# Patient Record
Sex: FEMALE | Race: WHITE | URBAN - METROPOLITAN AREA
[De-identification: names, ages, dates, MRNs, and addresses within clinical notes are randomized per-mention and may not be internally consistent; named-entity substitution may affect disease eponyms.]

---

## 2019-04-15 ENCOUNTER — OUTPATIENT (OUTPATIENT)
Dept: OUTPATIENT SERVICES | Facility: HOSPITAL | Age: 69
LOS: 1 days | Discharge: HOME | End: 2019-04-15
Payer: MEDICARE

## 2019-04-15 DIAGNOSIS — R06.02 SHORTNESS OF BREATH: ICD-10-CM

## 2019-04-15 PROCEDURE — 78452 HT MUSCLE IMAGE SPECT MULT: CPT | Mod: 26

## 2022-11-02 ENCOUNTER — OUTPATIENT (OUTPATIENT)
Dept: OUTPATIENT SERVICES | Facility: HOSPITAL | Age: 72
LOS: 1 days | Discharge: HOME | End: 2022-11-02

## 2022-11-02 VITALS
DIASTOLIC BLOOD PRESSURE: 77 MMHG | RESPIRATION RATE: 17 BRPM | WEIGHT: 263.89 LBS | HEART RATE: 60 BPM | HEIGHT: 63 IN | TEMPERATURE: 96 F | OXYGEN SATURATION: 96 % | SYSTOLIC BLOOD PRESSURE: 175 MMHG

## 2022-11-02 VITALS
HEART RATE: 55 BPM | RESPIRATION RATE: 16 BRPM | DIASTOLIC BLOOD PRESSURE: 58 MMHG | SYSTOLIC BLOOD PRESSURE: 126 MMHG | OXYGEN SATURATION: 96 %

## 2022-11-02 DIAGNOSIS — Z90.49 ACQUIRED ABSENCE OF OTHER SPECIFIED PARTS OF DIGESTIVE TRACT: Chronic | ICD-10-CM

## 2022-11-02 DIAGNOSIS — Z98.891 HISTORY OF UTERINE SCAR FROM PREVIOUS SURGERY: Chronic | ICD-10-CM

## 2022-11-02 NOTE — ASU PATIENT PROFILE, ADULT - NSICDXPASTMEDICALHX_GEN_ALL_CORE_FT
PAST MEDICAL HISTORY:  Anxiety     Arthritis pain b/l knee    H/O neuropathy     High cholesterol     HTN (hypertension)     Sleep apnea

## 2022-11-06 DIAGNOSIS — I50.32 CHRONIC DIASTOLIC (CONGESTIVE) HEART FAILURE: ICD-10-CM

## 2022-11-06 DIAGNOSIS — G47.33 OBSTRUCTIVE SLEEP APNEA (ADULT) (PEDIATRIC): ICD-10-CM

## 2022-11-06 DIAGNOSIS — H26.9 UNSPECIFIED CATARACT: ICD-10-CM

## 2022-11-06 DIAGNOSIS — F20.9 SCHIZOPHRENIA, UNSPECIFIED: ICD-10-CM

## 2022-11-06 DIAGNOSIS — E66.01 MORBID (SEVERE) OBESITY DUE TO EXCESS CALORIES: ICD-10-CM

## 2022-11-06 DIAGNOSIS — H57.04 MYDRIASIS: ICD-10-CM

## 2022-11-06 DIAGNOSIS — Z90.49 ACQUIRED ABSENCE OF OTHER SPECIFIED PARTS OF DIGESTIVE TRACT: ICD-10-CM

## 2022-11-06 DIAGNOSIS — I13.0 HYPERTENSIVE HEART AND CHRONIC KIDNEY DISEASE WITH HEART FAILURE AND STAGE 1 THROUGH STAGE 4 CHRONIC KIDNEY DISEASE, OR UNSPECIFIED CHRONIC KIDNEY DISEASE: ICD-10-CM

## 2022-11-06 DIAGNOSIS — F41.9 ANXIETY DISORDER, UNSPECIFIED: ICD-10-CM

## 2022-11-06 DIAGNOSIS — E78.00 PURE HYPERCHOLESTEROLEMIA, UNSPECIFIED: ICD-10-CM

## 2022-11-06 DIAGNOSIS — F33.2 MAJOR DEPRESSIVE DISORDER, RECURRENT SEVERE WITHOUT PSYCHOTIC FEATURES: ICD-10-CM

## 2022-11-06 DIAGNOSIS — M19.90 UNSPECIFIED OSTEOARTHRITIS, UNSPECIFIED SITE: ICD-10-CM

## 2022-11-06 DIAGNOSIS — Z79.82 LONG TERM (CURRENT) USE OF ASPIRIN: ICD-10-CM

## 2022-11-06 DIAGNOSIS — N18.31 CHRONIC KIDNEY DISEASE, STAGE 3A: ICD-10-CM

## 2022-11-16 ENCOUNTER — OUTPATIENT (OUTPATIENT)
Dept: OUTPATIENT SERVICES | Facility: HOSPITAL | Age: 72
LOS: 1 days | Discharge: HOME | End: 2022-11-16

## 2022-11-16 VITALS
HEART RATE: 59 BPM | RESPIRATION RATE: 59 BRPM | WEIGHT: 268.08 LBS | DIASTOLIC BLOOD PRESSURE: 66 MMHG | SYSTOLIC BLOOD PRESSURE: 154 MMHG | TEMPERATURE: 96 F | HEIGHT: 63 IN | OXYGEN SATURATION: 96 %

## 2022-11-16 VITALS
HEART RATE: 54 BPM | SYSTOLIC BLOOD PRESSURE: 139 MMHG | OXYGEN SATURATION: 95 % | RESPIRATION RATE: 16 BRPM | DIASTOLIC BLOOD PRESSURE: 87 MMHG

## 2022-11-16 DIAGNOSIS — Z98.891 HISTORY OF UTERINE SCAR FROM PREVIOUS SURGERY: Chronic | ICD-10-CM

## 2022-11-16 DIAGNOSIS — Z90.49 ACQUIRED ABSENCE OF OTHER SPECIFIED PARTS OF DIGESTIVE TRACT: Chronic | ICD-10-CM

## 2022-11-16 DIAGNOSIS — Z98.49 CATARACT EXTRACTION STATUS, UNSPECIFIED EYE: Chronic | ICD-10-CM

## 2022-11-16 RX ORDER — AZELASTINE HCL 0.05 %
1 DROPS OPHTHALMIC (EYE)
Qty: 0 | Refills: 0 | DISCHARGE

## 2022-11-16 NOTE — ASU PATIENT PROFILE, ADULT - NSICDXPASTSURGICALHX_GEN_ALL_CORE_FT
PAST SURGICAL HISTORY:  H/O cataract extraction right with iol    H/O  section     History of cholecystectomy     History of colon resection

## 2022-11-16 NOTE — ASU PATIENT PROFILE, ADULT - NSICDXPASTMEDICALHX_GEN_ALL_CORE_FT
PAST MEDICAL HISTORY:  Anxiety     Arthritis pain b/l knee    Cancer, colon     Cataract     Chronic CHF     H/O neuropathy     High cholesterol     HTN (hypertension)     Sleep apnea      PAST MEDICAL HISTORY:  Anxiety and depression     Arthritis pain b/l knee    Cancer, colon     Cataract     Chronic CHF     H/O neuropathy     High cholesterol     HTN (hypertension)     Sleep apnea

## 2022-11-16 NOTE — PRE-ANESTHESIA EVALUATION ADULT - NSANTHOSAYNRD_GEN_A_CORE
denies/No. TACOS screening performed.  STOP BANG Legend: 0-2 = LOW Risk; 3-4 = INTERMEDIATE Risk; 5-8 = HIGH Risk

## 2022-11-16 NOTE — ASU PATIENT PROFILE, ADULT - FALL HARM RISK - HARM RISK INTERVENTIONS
Communicate Risk of Fall with Harm to all staff/Reinforce activity limits and safety measures with patient and family/Tailored Fall Risk Interventions/Visual Cue: Yellow wristband and red socks/Bed in lowest position, wheels locked, appropriate side rails in place/Call bell, personal items and telephone in reach/Instruct patient to call for assistance before getting out of bed or chair/Non-slip footwear when patient is out of bed/[place] to call system/Physically safe environment - no spills, clutter or unnecessary equipment/Purposeful Proactive Rounding/Room/bathroom lighting operational, light cord in reach Assistance OOB with selected safe patient handling equipment/Communicate Risk of Fall with Harm to all staff/Discuss with provider need for PT consult/Monitor gait and stability/Provide patient with walking aids - walker, cane, crutches/Reinforce activity limits and safety measures with patient and family/Tailored Fall Risk Interventions/Visual Cue: Yellow wristband and red socks/Bed in lowest position, wheels locked, appropriate side rails in place/Call bell, personal items and telephone in reach/Instruct patient to call for assistance before getting out of bed or chair/Non-slip footwear when patient is out of bed/[place] to call system/Physically safe environment - no spills, clutter or unnecessary equipment/Purposeful Proactive Rounding/Room/bathroom lighting operational, light cord in reach

## 2022-11-21 DIAGNOSIS — Z79.1 LONG TERM (CURRENT) USE OF NON-STEROIDAL ANTI-INFLAMMATORIES (NSAID): ICD-10-CM

## 2022-11-21 DIAGNOSIS — M19.90 UNSPECIFIED OSTEOARTHRITIS, UNSPECIFIED SITE: ICD-10-CM

## 2022-11-21 DIAGNOSIS — I50.9 HEART FAILURE, UNSPECIFIED: ICD-10-CM

## 2022-11-21 DIAGNOSIS — H25.89 OTHER AGE-RELATED CATARACT: ICD-10-CM

## 2022-11-21 DIAGNOSIS — F41.9 ANXIETY DISORDER, UNSPECIFIED: ICD-10-CM

## 2022-11-21 DIAGNOSIS — E78.00 PURE HYPERCHOLESTEROLEMIA, UNSPECIFIED: ICD-10-CM

## 2022-11-21 DIAGNOSIS — F32.9 MAJOR DEPRESSIVE DISORDER, SINGLE EPISODE, UNSPECIFIED: ICD-10-CM

## 2022-11-21 DIAGNOSIS — Z85.038 PERSONAL HISTORY OF OTHER MALIGNANT NEOPLASM OF LARGE INTESTINE: ICD-10-CM

## 2022-11-21 DIAGNOSIS — G47.33 OBSTRUCTIVE SLEEP APNEA (ADULT) (PEDIATRIC): ICD-10-CM

## 2022-11-21 DIAGNOSIS — I11.0 HYPERTENSIVE HEART DISEASE WITH HEART FAILURE: ICD-10-CM

## 2023-06-07 ENCOUNTER — INPATIENT (INPATIENT)
Facility: HOSPITAL | Age: 73
LOS: 1 days | Discharge: HOME | DRG: 309 | End: 2023-06-09
Attending: INTERNAL MEDICINE | Admitting: HOSPITALIST
Payer: MEDICARE

## 2023-06-07 VITALS
HEART RATE: 49 BPM | SYSTOLIC BLOOD PRESSURE: 193 MMHG | OXYGEN SATURATION: 95 % | WEIGHT: 261.91 LBS | TEMPERATURE: 97 F | HEIGHT: 63 IN | RESPIRATION RATE: 18 BRPM | DIASTOLIC BLOOD PRESSURE: 71 MMHG

## 2023-06-07 DIAGNOSIS — Z98.49 CATARACT EXTRACTION STATUS, UNSPECIFIED EYE: Chronic | ICD-10-CM

## 2023-06-07 DIAGNOSIS — Z00.6 ENCOUNTER FOR EXAMINATION FOR NORMAL COMPARISON AND CONTROL IN CLINICAL RESEARCH PROGRAM: ICD-10-CM

## 2023-06-07 DIAGNOSIS — Z90.49 ACQUIRED ABSENCE OF OTHER SPECIFIED PARTS OF DIGESTIVE TRACT: Chronic | ICD-10-CM

## 2023-06-07 DIAGNOSIS — Z98.891 HISTORY OF UTERINE SCAR FROM PREVIOUS SURGERY: Chronic | ICD-10-CM

## 2023-06-07 DIAGNOSIS — R00.1 BRADYCARDIA, UNSPECIFIED: ICD-10-CM

## 2023-06-07 PROBLEM — H26.9 UNSPECIFIED CATARACT: Chronic | Status: ACTIVE | Noted: 2022-11-16

## 2023-06-07 PROBLEM — M19.90 UNSPECIFIED OSTEOARTHRITIS, UNSPECIFIED SITE: Chronic | Status: ACTIVE | Noted: 2022-11-02

## 2023-06-07 PROBLEM — E78.00 PURE HYPERCHOLESTEROLEMIA, UNSPECIFIED: Chronic | Status: ACTIVE | Noted: 2022-11-02

## 2023-06-07 PROBLEM — I50.9 HEART FAILURE, UNSPECIFIED: Chronic | Status: ACTIVE | Noted: 2022-11-16

## 2023-06-07 PROBLEM — I10 ESSENTIAL (PRIMARY) HYPERTENSION: Chronic | Status: ACTIVE | Noted: 2022-11-02

## 2023-06-07 PROBLEM — F41.9 ANXIETY DISORDER, UNSPECIFIED: Chronic | Status: ACTIVE | Noted: 2022-11-16

## 2023-06-07 PROBLEM — G47.30 SLEEP APNEA, UNSPECIFIED: Chronic | Status: ACTIVE | Noted: 2022-11-02

## 2023-06-07 PROBLEM — C18.9 MALIGNANT NEOPLASM OF COLON, UNSPECIFIED: Chronic | Status: ACTIVE | Noted: 2022-11-16

## 2023-06-07 PROBLEM — Z86.69 PERSONAL HISTORY OF OTHER DISEASES OF THE NERVOUS SYSTEM AND SENSE ORGANS: Chronic | Status: ACTIVE | Noted: 2022-11-02

## 2023-06-07 LAB
ALBUMIN SERPL ELPH-MCNC: 3.9 G/DL — SIGNIFICANT CHANGE UP (ref 3.5–5.2)
ALP SERPL-CCNC: 100 U/L — SIGNIFICANT CHANGE UP (ref 30–115)
ALT FLD-CCNC: 10 U/L — SIGNIFICANT CHANGE UP (ref 0–41)
ANION GAP SERPL CALC-SCNC: 10 MMOL/L — SIGNIFICANT CHANGE UP (ref 7–14)
APPEARANCE UR: CLEAR — SIGNIFICANT CHANGE UP
APTT BLD: 34.7 SEC — SIGNIFICANT CHANGE UP (ref 27–39.2)
AST SERPL-CCNC: 21 U/L — SIGNIFICANT CHANGE UP (ref 0–41)
BACTERIA # UR AUTO: NEGATIVE — SIGNIFICANT CHANGE UP
BASE EXCESS BLDV CALC-SCNC: 5.1 MMOL/L — HIGH (ref -2–3)
BASOPHILS # BLD AUTO: 0.03 K/UL — SIGNIFICANT CHANGE UP (ref 0–0.2)
BASOPHILS NFR BLD AUTO: 0.3 % — SIGNIFICANT CHANGE UP (ref 0–1)
BILIRUB SERPL-MCNC: 0.7 MG/DL — SIGNIFICANT CHANGE UP (ref 0.2–1.2)
BILIRUB UR-MCNC: NEGATIVE — SIGNIFICANT CHANGE UP
BLD GP AB SCN SERPL QL: SIGNIFICANT CHANGE UP
BUN SERPL-MCNC: 14 MG/DL — SIGNIFICANT CHANGE UP (ref 10–20)
CA-I SERPL-SCNC: 1.26 MMOL/L — SIGNIFICANT CHANGE UP (ref 1.15–1.33)
CALCIUM SERPL-MCNC: 10.3 MG/DL — SIGNIFICANT CHANGE UP (ref 8.4–10.4)
CHLORIDE SERPL-SCNC: 102 MMOL/L — SIGNIFICANT CHANGE UP (ref 98–110)
CO2 SERPL-SCNC: 29 MMOL/L — SIGNIFICANT CHANGE UP (ref 17–32)
COLOR SPEC: SIGNIFICANT CHANGE UP
CREAT SERPL-MCNC: 0.8 MG/DL — SIGNIFICANT CHANGE UP (ref 0.7–1.5)
DIFF PNL FLD: NEGATIVE — SIGNIFICANT CHANGE UP
EGFR: 77 ML/MIN/1.73M2 — SIGNIFICANT CHANGE UP
EOSINOPHIL # BLD AUTO: 0.16 K/UL — SIGNIFICANT CHANGE UP (ref 0–0.7)
EOSINOPHIL NFR BLD AUTO: 1.6 % — SIGNIFICANT CHANGE UP (ref 0–8)
EPI CELLS # UR: 2 /HPF — SIGNIFICANT CHANGE UP (ref 0–5)
GAS PNL BLDV: 140 MMOL/L — SIGNIFICANT CHANGE UP (ref 136–145)
GAS PNL BLDV: SIGNIFICANT CHANGE UP
GLUCOSE SERPL-MCNC: 86 MG/DL — SIGNIFICANT CHANGE UP (ref 70–99)
GLUCOSE UR QL: NEGATIVE — SIGNIFICANT CHANGE UP
HCO3 BLDV-SCNC: 30 MMOL/L — HIGH (ref 22–29)
HCT VFR BLD CALC: 46.4 % — SIGNIFICANT CHANGE UP (ref 37–47)
HCT VFR BLDA CALC: 40 % — SIGNIFICANT CHANGE UP (ref 39–51)
HGB BLD CALC-MCNC: 13.4 G/DL — SIGNIFICANT CHANGE UP (ref 12.6–17.4)
HGB BLD-MCNC: 15.2 G/DL — SIGNIFICANT CHANGE UP (ref 12–16)
HYALINE CASTS # UR AUTO: 0 /LPF — SIGNIFICANT CHANGE UP (ref 0–7)
IMM GRANULOCYTES NFR BLD AUTO: 0.4 % — HIGH (ref 0.1–0.3)
INR BLD: 1.08 RATIO — SIGNIFICANT CHANGE UP (ref 0.65–1.3)
KETONES UR-MCNC: NEGATIVE — SIGNIFICANT CHANGE UP
LACTATE BLDV-MCNC: 0.8 MMOL/L — SIGNIFICANT CHANGE UP (ref 0.5–2)
LACTATE SERPL-SCNC: 0.9 MMOL/L — SIGNIFICANT CHANGE UP (ref 0.7–2)
LEUKOCYTE ESTERASE UR-ACNC: ABNORMAL
LYMPHOCYTES # BLD AUTO: 2.65 K/UL — SIGNIFICANT CHANGE UP (ref 1.2–3.4)
LYMPHOCYTES # BLD AUTO: 25.8 % — SIGNIFICANT CHANGE UP (ref 20.5–51.1)
MAGNESIUM SERPL-MCNC: 2.1 MG/DL — SIGNIFICANT CHANGE UP (ref 1.8–2.4)
MCHC RBC-ENTMCNC: 30 PG — SIGNIFICANT CHANGE UP (ref 27–31)
MCHC RBC-ENTMCNC: 32.8 G/DL — SIGNIFICANT CHANGE UP (ref 32–37)
MCV RBC AUTO: 91.7 FL — SIGNIFICANT CHANGE UP (ref 81–99)
MONOCYTES # BLD AUTO: 0.96 K/UL — HIGH (ref 0.1–0.6)
MONOCYTES NFR BLD AUTO: 9.3 % — SIGNIFICANT CHANGE UP (ref 1.7–9.3)
NEUTROPHILS # BLD AUTO: 6.44 K/UL — SIGNIFICANT CHANGE UP (ref 1.4–6.5)
NEUTROPHILS NFR BLD AUTO: 62.6 % — SIGNIFICANT CHANGE UP (ref 42.2–75.2)
NITRITE UR-MCNC: NEGATIVE — SIGNIFICANT CHANGE UP
NRBC # BLD: 0 /100 WBCS — SIGNIFICANT CHANGE UP (ref 0–0)
PCO2 BLDV: 47 MMHG — HIGH (ref 39–42)
PH BLDV: 7.42 — SIGNIFICANT CHANGE UP (ref 7.32–7.43)
PH UR: 8 — SIGNIFICANT CHANGE UP (ref 5–8)
PLATELET # BLD AUTO: 294 K/UL — SIGNIFICANT CHANGE UP (ref 130–400)
PMV BLD: 10.1 FL — SIGNIFICANT CHANGE UP (ref 7.4–10.4)
PO2 BLDV: 42 MMHG — SIGNIFICANT CHANGE UP
POTASSIUM BLDV-SCNC: 3.7 MMOL/L — SIGNIFICANT CHANGE UP (ref 3.5–5.1)
POTASSIUM SERPL-MCNC: 3.8 MMOL/L — SIGNIFICANT CHANGE UP (ref 3.5–5)
POTASSIUM SERPL-SCNC: 3.8 MMOL/L — SIGNIFICANT CHANGE UP (ref 3.5–5)
PROT SERPL-MCNC: 7.7 G/DL — SIGNIFICANT CHANGE UP (ref 6–8)
PROT UR-MCNC: NEGATIVE — SIGNIFICANT CHANGE UP
PROTHROM AB SERPL-ACNC: 12.3 SEC — SIGNIFICANT CHANGE UP (ref 9.95–12.87)
RBC # BLD: 5.06 M/UL — SIGNIFICANT CHANGE UP (ref 4.2–5.4)
RBC # FLD: 14 % — SIGNIFICANT CHANGE UP (ref 11.5–14.5)
RBC CASTS # UR COMP ASSIST: 0 /HPF — SIGNIFICANT CHANGE UP (ref 0–4)
SAO2 % BLDV: 71.7 % — SIGNIFICANT CHANGE UP
SODIUM SERPL-SCNC: 141 MMOL/L — SIGNIFICANT CHANGE UP (ref 135–146)
SP GR SPEC: 1.02 — SIGNIFICANT CHANGE UP (ref 1.01–1.03)
TROPONIN T SERPL-MCNC: <0.01 NG/ML — SIGNIFICANT CHANGE UP
UROBILINOGEN FLD QL: SIGNIFICANT CHANGE UP
WBC # BLD: 10.28 K/UL — SIGNIFICANT CHANGE UP (ref 4.8–10.8)
WBC # FLD AUTO: 10.28 K/UL — SIGNIFICANT CHANGE UP (ref 4.8–10.8)
WBC UR QL: 5 /HPF — SIGNIFICANT CHANGE UP (ref 0–5)

## 2023-06-07 PROCEDURE — 83880 ASSAY OF NATRIURETIC PEPTIDE: CPT

## 2023-06-07 PROCEDURE — 70450 CT HEAD/BRAIN W/O DYE: CPT | Mod: 26,59,MA

## 2023-06-07 PROCEDURE — 70498 CT ANGIOGRAPHY NECK: CPT | Mod: 26,MA

## 2023-06-07 PROCEDURE — 36415 COLL VENOUS BLD VENIPUNCTURE: CPT

## 2023-06-07 PROCEDURE — 70496 CT ANGIOGRAPHY HEAD: CPT | Mod: 26,MA

## 2023-06-07 PROCEDURE — 93017 CV STRESS TEST TRACING ONLY: CPT

## 2023-06-07 PROCEDURE — 83735 ASSAY OF MAGNESIUM: CPT

## 2023-06-07 PROCEDURE — 84100 ASSAY OF PHOSPHORUS: CPT

## 2023-06-07 PROCEDURE — 80053 COMPREHEN METABOLIC PANEL: CPT

## 2023-06-07 PROCEDURE — 93307 TTE W/O DOPPLER COMPLETE: CPT

## 2023-06-07 PROCEDURE — 93005 ELECTROCARDIOGRAM TRACING: CPT | Mod: Z9

## 2023-06-07 PROCEDURE — 71045 X-RAY EXAM CHEST 1 VIEW: CPT

## 2023-06-07 PROCEDURE — 84443 ASSAY THYROID STIM HORMONE: CPT

## 2023-06-07 PROCEDURE — 83036 HEMOGLOBIN GLYCOSYLATED A1C: CPT

## 2023-06-07 PROCEDURE — 99223 1ST HOSP IP/OBS HIGH 75: CPT

## 2023-06-07 PROCEDURE — 93970 EXTREMITY STUDY: CPT | Mod: 26

## 2023-06-07 PROCEDURE — 86803 HEPATITIS C AB TEST: CPT

## 2023-06-07 PROCEDURE — 80061 LIPID PANEL: CPT

## 2023-06-07 PROCEDURE — 85025 COMPLETE CBC W/AUTO DIFF WBC: CPT

## 2023-06-07 PROCEDURE — 99285 EMERGENCY DEPT VISIT HI MDM: CPT

## 2023-06-07 PROCEDURE — 84484 ASSAY OF TROPONIN QUANT: CPT

## 2023-06-07 RX ORDER — LIDOCAINE 4 G/100G
0 CREAM TOPICAL
Qty: 0 | Refills: 0 | DISCHARGE

## 2023-06-07 RX ORDER — ATORVASTATIN CALCIUM 80 MG/1
0 TABLET, FILM COATED ORAL
Qty: 0 | Refills: 0 | DISCHARGE

## 2023-06-07 RX ORDER — ACETAMINOPHEN 500 MG
2 TABLET ORAL
Qty: 0 | Refills: 0 | DISCHARGE

## 2023-06-07 RX ORDER — FUROSEMIDE 40 MG
40 TABLET ORAL DAILY
Refills: 0 | Status: DISCONTINUED | OUTPATIENT
Start: 2023-06-07 | End: 2023-06-09

## 2023-06-07 RX ORDER — TRAZODONE HCL 50 MG
0 TABLET ORAL
Qty: 0 | Refills: 0 | DISCHARGE

## 2023-06-07 RX ORDER — MECLIZINE HCL 12.5 MG
50 TABLET ORAL ONCE
Refills: 0 | Status: COMPLETED | OUTPATIENT
Start: 2023-06-07 | End: 2023-06-07

## 2023-06-07 RX ORDER — LANOLIN ALCOHOL/MO/W.PET/CERES
5 CREAM (GRAM) TOPICAL AT BEDTIME
Refills: 0 | Status: DISCONTINUED | OUTPATIENT
Start: 2023-06-07 | End: 2023-06-09

## 2023-06-07 RX ORDER — HYDROXYZINE HCL 10 MG
1 TABLET ORAL
Qty: 0 | Refills: 0 | DISCHARGE

## 2023-06-07 RX ORDER — DILTIAZEM HCL 120 MG
1 CAPSULE, EXT RELEASE 24 HR ORAL
Qty: 0 | Refills: 0 | DISCHARGE

## 2023-06-07 RX ORDER — ENOXAPARIN SODIUM 100 MG/ML
40 INJECTION SUBCUTANEOUS EVERY 24 HOURS
Refills: 0 | Status: DISCONTINUED | OUTPATIENT
Start: 2023-06-07 | End: 2023-06-09

## 2023-06-07 RX ORDER — FUROSEMIDE 40 MG
0 TABLET ORAL
Qty: 0 | Refills: 0 | DISCHARGE

## 2023-06-07 RX ORDER — LISINOPRIL 2.5 MG/1
20 TABLET ORAL DAILY
Refills: 0 | Status: DISCONTINUED | OUTPATIENT
Start: 2023-06-08 | End: 2023-06-09

## 2023-06-07 RX ORDER — CAPTOPRIL 12.5 MG/1
25 TABLET ORAL ONCE
Refills: 0 | Status: DISCONTINUED | OUTPATIENT
Start: 2023-06-07 | End: 2023-06-07

## 2023-06-07 RX ADMIN — Medication 50 MILLIGRAM(S): at 14:14

## 2023-06-07 NOTE — ED PROVIDER NOTE - NSICDXPASTMEDICALHX_GEN_ALL_CORE_FT
PAST MEDICAL HISTORY:  Anxiety and depression     Arthritis pain b/l knee    Cancer, colon     Cataract     Chronic CHF     H/O neuropathy     High cholesterol     HTN (hypertension)     Sleep apnea

## 2023-06-07 NOTE — ED PROVIDER NOTE - OBJECTIVE STATEMENT
74 yo f with pmh of htn, peripheral edema, colon CA in remission, htn, hld, anxiety/depression on trazadone, sent by pmd's office for worsening dizziness and bradycardia.  pt says has been having dizziness for a while now, but worse in the last month.  noted on her pulse ox that her hr was 39-40s 10 days ago, so stopped her diltiazem.  pt says she felt better for a few days, but symptoms started again.  pt says feels worse with movement.  feels spinning and lightheaded.  walking normally.  mild nausea.  no abd pain, no cp.  no sob.  noted LLE swelling, pt says "for a while now".  pt sees dr. page morrell, last time 9 months ago, but says she cant' remember if any tests were done

## 2023-06-07 NOTE — H&P ADULT - ASSESSMENT
a 73 year old female with past medical history of HTN, HLD, colon cancer S/P surgery, in remission. Hx of anxiety/depression disorder, CHF?. She was referred to the emergency department from her PCP clinic for further evaluation of bradycardia.       # Symptomatic bradycardia with dizziness  # Impression: Trazodone-induced, SA node dysfunction need to be ruled out   # Possible CHF?  # HTN, HLD  - Currently she is free of dizziness but it happens mainly with exercise sometimes  - Review of system is positive for occasional palpitations and lower limb edema   - Examination is unremarkable, Her HR increased on standing reaching 60's. Resting HR on telemetry is 51.   - EKG showed no signs of ischemia, sinus bradycardia with occasional PVC  - CTA of the brain showed no acute pathology   - negative troponin   - DC trazodone  - Follow up echocardiogram, TSH, BNP  - Repeat EKG, troponin in AM  - CXR   - Continue Lasix as 40 mg daily ( she wasn't compliant with twice a day )  - start Lisinopril 20 mg daily ( was on home diltiazem that was discontinued earlier )  - check orthostatics   - EP consult  - continue telemetry monitoring       # Hypertensive urgency in the ED  - repeated BP on evaluation showing /90 ( was 190/90 )  - Was on home diltiazem that was discontinued   - start lisinopril 20 mg daily   - add amlodipine if not improving       # Hx of colon cancer in remission  # Hx of anxiety/depression disorder       GI prophylaxis: not needed   DVT prophylaxis: Enoxaparin 40 mg   Full code  Diet: regular DASH  a 73 year old female with past medical history of HTN, HLD, colon cancer S/P surgery, in remission. Hx of anxiety/depression disorder, CHF?. She was referred to the emergency department from her PCP clinic for further evaluation of bradycardia.       # Symptomatic bradycardia with dizziness  # Impression: Trazodone-induced, SA node dysfunction need to be ruled out   # Possible CHF?  # HTN, HLD  - Currently she is free of dizziness but it happens mainly with exercise sometimes  - Review of system is positive for occasional palpitations and lower limb edema   - Examination is unremarkable, Her HR increased on standing reaching 60's. Resting HR on telemetry is 51.   - EKG showed no signs of ischemia, sinus bradycardia with occasional PVC  - CTA of the brain showed no acute pathology   - negative troponin   - DC trazodone  - Follow up echocardiogram, TSH, BNP  - Repeat EKG, troponin in AM  - CXR   - Continue Lasix as 40 mg daily ( she wasn't compliant with twice a day )  - start Lisinopril 20 mg daily ( was on home diltiazem that was discontinued earlier )  - check orthostatics   - EP and cardiology consult ( Her cardiologist Dr. Michelle )  - follow up US duplex   - continue telemetry monitoring       # Hypertensive urgency in the ED  - repeated BP on evaluation showing /90 ( was 190/90 )  - Was on home diltiazem that was discontinued   - start lisinopril 20 mg daily   - add amlodipine if not improving       # Hx of colon cancer in remission  # Hx of anxiety/depression disorder       GI prophylaxis: not needed   DVT prophylaxis: Enoxaparin 40 mg   Full code  Diet: regular DASH

## 2023-06-07 NOTE — H&P ADULT - ATTENDING COMMENTS
72 YO F w/ a PMH of HTN, peripheral edema, colon CA (in remission), and anxiety/depression [place] [facility] [place] w/ a c/o lightheadedness on exertion for the past x 2 weeks. Associated w/ LOPES and intermittent palpitations. Denies any fevers/chills, cough, CP, ABD pain, or N/V/D. ROS is positive for LE swelling, otherwise negative except as above.     In the ED, the cardiac enzymes were negative and the EKG showed sinus bradycardia.     FMHx:   -No family Hx of early cardiac death, CAD, asthma, or genetic disorders identified    Physical exam shows pt in NAD. HR (55), afebrile, not hypoxic on RA. A&Ox3. Neuro exam without deficits, motor/sensory intact, no dysarthria, no facial asymmetry. Muscle strength/sensation intact. CTA B/L with no W/C/R. RRR, no M/G/R. ABD is soft and non-tender, normoactive BSs. LEs with trace pitting edema B/L. No rashes. Labs and radiology as above.     Light-headedness and LOPES from symptomatic sinus bradycardia, suspect medication (Trazodone) adverse effect. Tele. Serial cardiac enzymes and EKGs. TSH. Echo. Cardio consult ([facility]).   -Stop Trazodone, can cause sinus bradycardia in elderly females    Hypertensive urgency. Can start pt on oral BP meds if persistently elevated, not AVB agents. Monitor VSs.     Hx of peripheral edema, colon CA (in remission), and anxiety/depression. Restart home meds, except as stated above. DVT PPX. Inform PCP of pt's admission to hospital. My note supersedes the residents note.     Date seen by [email or web address] 74 YO F w/ a PMH of HTN, peripheral edema, colon CA (in remission), and anxiety/depression who was sent into the hospital from [facility] [place] after presenting there w/ a c/o lightheadedness on exertion for the past x 2 weeks. Associated w/ LOPES and intermittent palpitations. Denies any fevers/chills, cough, CP, ABD pain, or N/V/D. ROS is positive for LE swelling, otherwise negative except as above.     Of note, the pt is supposed to be taking Diltiazem 180 mg QD but she self-discontinued ~ 2 weeks ago when a home pulse ox showed her HR in the 30s.     In the ED, the cardiac enzymes were negative and the EKG showed sinus bradycardia.     FMHx:   -No family Hx of early cardiac death, CAD, asthma, or genetic disorders identified    Physical exam shows pt in NAD. HR (55), afebrile, not hypoxic on RA. A&Ox3. Neuro exam without deficits, motor/sensory intact, no dysarthria, no facial asymmetry. Muscle strength/sensation intact. CTA B/L with no W/C/R. RRR, no M/G/R. ABD is soft and non-tender, normoactive BSs. LEs with trace pitting edema B/L. No rashes. Labs and radiology as above.     Light-headedness and LOPES from symptomatic sinus bradycardia, suspect medication (Trazodone) adverse effect. Tele. Serial cardiac enzymes and EKGs. TSH. Echo. Cardio consult ([facility]).   -Stop Trazodone, can cause sinus bradycardia in elderly females    Hypertensive urgency. Can start pt on oral BP meds if persistently elevated, not AVB agents. Monitor VSs.   -Pt is supposed to be taking Diltiazem 180 mg QD but she self-discontinued ~ 2 weeks ago when a home pulse ox showed her HR in the 30s.     Hx of peripheral edema, colon CA (in remission), and anxiety/depression. Restart home meds, except as stated above. DVT PPX. Inform PCP of pt's admission to hospital. My note supersedes the residents note.     Date seen by [email or web address] 72 YO F w/ a PMH of HTN, peripheral edema, colon CA (in remission), and anxiety/depression who was sent into the hospital from [facility] [place] after presenting there w/ a c/o lightheadedness on exertion for the past x 2 weeks. Associated w/ LOPES and intermittent palpitations. Denies any fevers/chills, cough, CP, ABD pain, or N/V/D. ROS is positive for LE swelling, otherwise negative except as above.     Of note, the pt is supposed to be taking Diltiazem 180 mg QD but she self-discontinued ~ 2 weeks ago when a home pulse ox showed her HR in the 30s.     In the ED, the cardiac enzymes were negative and the EKG showed sinus bradycardia.     FMHx:   -No family Hx of early cardiac death, CAD, asthma, or genetic disorders identified    Physical exam shows pt in NAD. HR (55), afebrile, not hypoxic on RA. A&Ox3. Neuro exam without deficits, motor/sensory intact, no dysarthria, no facial asymmetry. Muscle strength/sensation intact. CTA B/L with no W/C/R. RRR, no M/G/R. ABD is soft and non-tender, normoactive BSs. LEs with trace pitting edema B/L. No rashes. Labs and radiology as above.     Light-headedness and LOPES from symptomatic sinus bradycardia, suspect medication (Trazodone) adverse effect. Tele. Serial cardiac enzymes and EKGs. TSH. Echo. Atropine at bedside. Cardio consult ([facility]).   -Stop Trazodone, can cause sinus bradycardia in elderly females    Hypertensive urgency. Can start pt on oral BP meds if persistently elevated, not AVB agents. Monitor VSs.   -Pt is supposed to be taking Diltiazem 180 mg QD but she self-discontinued ~ 2 weeks ago when a home pulse ox showed her HR in the 30s.     Hx of peripheral edema, colon CA (in remission), and anxiety/depression. Restart home meds, except as stated above. DVT PPX. Inform PCP of pt's admission to hospital. My note supersedes the residents note.     Date seen by [email or web address]

## 2023-06-07 NOTE — H&P ADULT - NSHPREVIEWOFSYSTEMS_GEN_ALL_CORE
REVIEW OF SYSTEMS:    CONSTITUTIONAL: No weakness, fevers or chills  EYES/ENT: No visual changes;  No vertigo or throat pain, no headache   NECK: No pain or stiffness  RESPIRATORY: No cough, wheezing, hemoptysis; No shortness of breath  CARDIOVASCULAR: No chest pain. + palpitations, + lower limb edema more on left side   GASTROINTESTINAL: No abdominal or epigastric pain. No nausea, vomiting, or hematemesis; No diarrhea or constipation. No melena or hematochezia.  GENITOURINARY: No dysuria, frequency or hematuria  NEUROLOGICAL: No numbness or weakness  SKIN: No itching, rashes  MUSCULOSKELETAL: no arthralgia or arthritis

## 2023-06-07 NOTE — ED ADULT TRIAGE NOTE - CHIEF COMPLAINT QUOTE
Patient sent in from  for dizziness and SOB on exertion x2 weeks- deb went to  where she was found to be bradycardic (40)

## 2023-06-07 NOTE — H&P ADULT - NSHPLABSRESULTS_GEN_ALL_CORE
15.2   10.28 )-----------( 294      ( 2023 14:18 )             46.4       06-07    141  |  102  |  14  ----------------------------<  86  3.8   |  29  |  0.8    Ca    10.3      2023 14:18  Mg     2.1     06-07    TPro  7.7  /  Alb  3.9  /  TBili  0.7  /  DBili  x   /  AST  21  /  ALT  10  /  AlkPhos  100  06-07              Urinalysis Basic - ( 2023 17:55 )    Color: Light Yellow / Appearance: Clear / S.017 / pH: x  Gluc: x / Ketone: Negative  / Bili: Negative / Urobili: <2 mg/dL   Blood: x / Protein: Negative / Nitrite: Negative   Leuk Esterase: Small / RBC: 0 /HPF / WBC 5 /HPF   Sq Epi: x / Non Sq Epi: x / Bacteria: Negative        PT/INR - ( 2023 14:18 )   PT: 12.30 sec;   INR: 1.08 ratio         PTT - ( 2023 14:18 )  PTT:34.7 sec    Lactate Trend   @ 14:18 Lactate:0.9       CARDIAC MARKERS ( 2023 14:18 )  x     / <0.01 ng/mL / x     / x     / x

## 2023-06-07 NOTE — ED PROVIDER NOTE - CLINICAL SUMMARY MEDICAL DECISION MAKING FREE TEXT BOX
Patient evaluated for dizziness.  Evaluated for vertigo with a normal CT angio and CT head.  EKG showed sinus bradycardia.  Lab work was normal without any electrolyte abnormalities.  Troponin was normal.  Denies any other coingestions or extra doses of medications.  Given her persistent symptoms and bradycardia on heart monitor patient admitted for further management

## 2023-06-07 NOTE — H&P ADULT - NSHPPHYSICALEXAM_GEN_ALL_CORE
PHYSICAL EXAM:  GENERAL: NAD  EYES: conjunctiva and sclera clear  ENMT: No tonsillar erythema, exudates, or enlargement; Moist mucous membranes  NECK: Supple, No JVD, Normal thyroid  HEART: Regular rate and rhythm; No murmurs, rubs, or gallops, Normal S1 S2   RESPIRATORY: decreased air entry bilaterally, resonant percussion note, no added sounds   ABDOMEN: Soft, Nontender, Nondistended; Bowel sounds present  NEUROLOGY: A&Ox3, grossly intact power and sensation. negative cerebellar signs    EXTREMITIES:  2+ Peripheral Pulses, No clubbing, cyanosis, bilateral pitting edema of lower limbs, more on left side. left upper limb edema   SKIN: warm, dry, normal color, no rash or abnormal lesions

## 2023-06-07 NOTE — H&P ADULT - HISTORY OF PRESENT ILLNESS
a 73 year old female with past medical history of HTN, HLD, colon cancer S/P surgery, in remission. Hx of anxiety/depression disorder, CHF?. She was referred to the emergency department from her PCP clinic for further evaluation of bradycardia.     The patient was at her baseline with occasional dizziness coming and resolving on it's own with bradycardia reaching 40's but no symptoms, until 2 weeks when her dizziness started affecting her daily activities, so she stopped taking diltiazem and she felt fine, her heart rate increased to around 48. She went for regular follow up with her PCP clinic who found that her heart rate remained below 50 despite stopping the diltiazem so advised to go to the ED for further evaluation. Her dizziness occur mainly on ambulation and working in house, not associated with chest pain, or shortness of breath, but she feels occasional palpitations as drop beats ( she called it PVC ). Dizziness described as imbalance, but never had any loss of consciousness, no weakness or numbness, no abnormal movements. She has no fever, no chills. She has chronic lower limb edema more on left side.     In the ED Vital Signs T(C): 35.6 HR: 49 BP: 193/71 SpO2: 100%     EKG showed no signs of ischemia, sinus bradycardia with occasional PVC    CTA of the brain showed no acute pathology     admitted to telemetry for further monitoring and evaluation

## 2023-06-07 NOTE — ED PROVIDER NOTE - PHYSICAL EXAMINATION
VITAL SIGNS: I have reviewed nursing notes and confirm.  CONSTITUTIONAL: Well-developed; well-nourished; in no acute distress.  SKIN: Skin exam is warm and dry, no acute rash.  HEAD: Normocephalic; atraumatic.  EYES: PERRL, EOM intact; conjunctiva and sclera clear.  ENT: No nasal discharge; airway clear. TMs clear.  NECK: Supple; non tender.  CARD: S1, S2 normal; no murmurs, gallops, or rubs. Regular rate and rhythm.  RESP: No wheezes, rales or rhonchi.  ABD: Normal bowel sounds; soft; non-distended; non-tender;  EXT: Normal ROM. No clubbing, cyanosis or edema.  NEURO: aox3, cn2-12 grossly normal, 5/5 strength all ext, sensation intact, finger to nose normal, heel shin normal, romberg neg, normal gait, no nystagmus, dizzy when getting up (feels spinning)  PSYCH: Cooperative, appropriate.

## 2023-06-08 DIAGNOSIS — I10 ESSENTIAL (PRIMARY) HYPERTENSION: ICD-10-CM

## 2023-06-08 DIAGNOSIS — Z79.899 OTHER LONG TERM (CURRENT) DRUG THERAPY: ICD-10-CM

## 2023-06-08 DIAGNOSIS — E78.5 HYPERLIPIDEMIA, UNSPECIFIED: ICD-10-CM

## 2023-06-08 DIAGNOSIS — R42 DIZZINESS AND GIDDINESS: ICD-10-CM

## 2023-06-08 DIAGNOSIS — C18.9 MALIGNANT NEOPLASM OF COLON, UNSPECIFIED: ICD-10-CM

## 2023-06-08 DIAGNOSIS — Z86.59 PERSONAL HISTORY OF OTHER MENTAL AND BEHAVIORAL DISORDERS: ICD-10-CM

## 2023-06-08 LAB
A1C WITH ESTIMATED AVERAGE GLUCOSE RESULT: 5.7 % — HIGH (ref 4–5.6)
ALBUMIN SERPL ELPH-MCNC: 3.9 G/DL — SIGNIFICANT CHANGE UP (ref 3.5–5.2)
ALP SERPL-CCNC: 100 U/L — SIGNIFICANT CHANGE UP (ref 30–115)
ALT FLD-CCNC: 11 U/L — SIGNIFICANT CHANGE UP (ref 0–41)
ANION GAP SERPL CALC-SCNC: 12 MMOL/L — SIGNIFICANT CHANGE UP (ref 7–14)
AST SERPL-CCNC: 21 U/L — SIGNIFICANT CHANGE UP (ref 0–41)
BASOPHILS # BLD AUTO: 0.04 K/UL — SIGNIFICANT CHANGE UP (ref 0–0.2)
BASOPHILS NFR BLD AUTO: 0.4 % — SIGNIFICANT CHANGE UP (ref 0–1)
BILIRUB SERPL-MCNC: 0.8 MG/DL — SIGNIFICANT CHANGE UP (ref 0.2–1.2)
BUN SERPL-MCNC: 12 MG/DL — SIGNIFICANT CHANGE UP (ref 10–20)
CALCIUM SERPL-MCNC: 9.9 MG/DL — SIGNIFICANT CHANGE UP (ref 8.4–10.4)
CHLORIDE SERPL-SCNC: 104 MMOL/L — SIGNIFICANT CHANGE UP (ref 98–110)
CHOLEST SERPL-MCNC: 201 MG/DL — HIGH
CO2 SERPL-SCNC: 27 MMOL/L — SIGNIFICANT CHANGE UP (ref 17–32)
CREAT SERPL-MCNC: 0.8 MG/DL — SIGNIFICANT CHANGE UP (ref 0.7–1.5)
EGFR: 77 ML/MIN/1.73M2 — SIGNIFICANT CHANGE UP
EOSINOPHIL # BLD AUTO: 0.2 K/UL — SIGNIFICANT CHANGE UP (ref 0–0.7)
EOSINOPHIL NFR BLD AUTO: 2 % — SIGNIFICANT CHANGE UP (ref 0–8)
ESTIMATED AVERAGE GLUCOSE: 117 MG/DL — HIGH (ref 68–114)
GLUCOSE SERPL-MCNC: 87 MG/DL — SIGNIFICANT CHANGE UP (ref 70–99)
HCT VFR BLD CALC: 46.8 % — SIGNIFICANT CHANGE UP (ref 37–47)
HCV AB S/CO SERPL IA: 0.03 COI — SIGNIFICANT CHANGE UP
HCV AB SERPL-IMP: SIGNIFICANT CHANGE UP
HDLC SERPL-MCNC: 46 MG/DL — LOW
HGB BLD-MCNC: 15.2 G/DL — SIGNIFICANT CHANGE UP (ref 12–16)
IMM GRANULOCYTES NFR BLD AUTO: 0.2 % — SIGNIFICANT CHANGE UP (ref 0.1–0.3)
LIPID PNL WITH DIRECT LDL SERPL: 111 MG/DL — HIGH
LYMPHOCYTES # BLD AUTO: 2.61 K/UL — SIGNIFICANT CHANGE UP (ref 1.2–3.4)
LYMPHOCYTES # BLD AUTO: 25.5 % — SIGNIFICANT CHANGE UP (ref 20.5–51.1)
MCHC RBC-ENTMCNC: 30 PG — SIGNIFICANT CHANGE UP (ref 27–31)
MCHC RBC-ENTMCNC: 32.5 G/DL — SIGNIFICANT CHANGE UP (ref 32–37)
MCV RBC AUTO: 92.3 FL — SIGNIFICANT CHANGE UP (ref 81–99)
MONOCYTES # BLD AUTO: 0.87 K/UL — HIGH (ref 0.1–0.6)
MONOCYTES NFR BLD AUTO: 8.5 % — SIGNIFICANT CHANGE UP (ref 1.7–9.3)
NEUTROPHILS # BLD AUTO: 6.5 K/UL — SIGNIFICANT CHANGE UP (ref 1.4–6.5)
NEUTROPHILS NFR BLD AUTO: 63.4 % — SIGNIFICANT CHANGE UP (ref 42.2–75.2)
NON HDL CHOLESTEROL: 155 MG/DL — HIGH
NRBC # BLD: 0 /100 WBCS — SIGNIFICANT CHANGE UP (ref 0–0)
NT-PROBNP SERPL-SCNC: 733 PG/ML — HIGH (ref 0–300)
PLATELET # BLD AUTO: 278 K/UL — SIGNIFICANT CHANGE UP (ref 130–400)
PMV BLD: 10.5 FL — HIGH (ref 7.4–10.4)
POTASSIUM SERPL-MCNC: 4.2 MMOL/L — SIGNIFICANT CHANGE UP (ref 3.5–5)
POTASSIUM SERPL-SCNC: 4.2 MMOL/L — SIGNIFICANT CHANGE UP (ref 3.5–5)
PROT SERPL-MCNC: 7.8 G/DL — SIGNIFICANT CHANGE UP (ref 6–8)
RBC # BLD: 5.07 M/UL — SIGNIFICANT CHANGE UP (ref 4.2–5.4)
RBC # FLD: 14.3 % — SIGNIFICANT CHANGE UP (ref 11.5–14.5)
SODIUM SERPL-SCNC: 143 MMOL/L — SIGNIFICANT CHANGE UP (ref 135–146)
TRIGL SERPL-MCNC: 219 MG/DL — HIGH
TROPONIN T SERPL-MCNC: <0.01 NG/ML — SIGNIFICANT CHANGE UP
TSH SERPL-MCNC: 3.22 UIU/ML — SIGNIFICANT CHANGE UP (ref 0.27–4.2)
WBC # BLD: 10.24 K/UL — SIGNIFICANT CHANGE UP (ref 4.8–10.8)
WBC # FLD AUTO: 10.24 K/UL — SIGNIFICANT CHANGE UP (ref 4.8–10.8)

## 2023-06-08 PROCEDURE — 99222 1ST HOSP IP/OBS MODERATE 55: CPT

## 2023-06-08 PROCEDURE — 93010 ELECTROCARDIOGRAM REPORT: CPT

## 2023-06-08 PROCEDURE — 71045 X-RAY EXAM CHEST 1 VIEW: CPT | Mod: 26

## 2023-06-08 PROCEDURE — 99233 SBSQ HOSP IP/OBS HIGH 50: CPT

## 2023-06-08 RX ORDER — AMLODIPINE BESYLATE 2.5 MG/1
5 TABLET ORAL ONCE
Refills: 0 | Status: COMPLETED | OUTPATIENT
Start: 2023-06-08 | End: 2023-06-08

## 2023-06-08 RX ADMIN — Medication 40 MILLIGRAM(S): at 06:07

## 2023-06-08 RX ADMIN — LISINOPRIL 20 MILLIGRAM(S): 2.5 TABLET ORAL at 06:07

## 2023-06-08 RX ADMIN — AMLODIPINE BESYLATE 5 MILLIGRAM(S): 2.5 TABLET ORAL at 11:21

## 2023-06-08 NOTE — ED ADULT NURSE NOTE - CCCP TRG CHIEF CMPLNT
dizziness Rifampin Counseling: I discussed with the patient the risks of rifampin including but not limited to liver damage, kidney damage, red-orange body fluids, nausea/vomiting and severe allergy.

## 2023-06-08 NOTE — CONSULT NOTE ADULT - SUBJECTIVE AND OBJECTIVE BOX
Outpt cardiologist:    Reason for Consult:     HISTORY OF PRESENT ILLNESS:  a 73 year old female with past medical history of HTN, HLD, colon cancer S/P surgery, in remission. Hx of anxiety/depression disorder, CHF?. She was referred to the emergency department from her PCP clinic for further evaluation of bradycardia.     The patient was at her baseline with occasional dizziness coming and resolving on it's own with bradycardia reaching 40's but no symptoms, until 2 weeks when her dizziness started affecting her daily activities, so she stopped taking diltiazem and she felt fine, her heart rate increased to around 48. She went for regular follow up with her PCP clinic who found that her heart rate remained below 50 despite stopping the diltiazem so advised to go to the ED for further evaluation. Her dizziness occur mainly on ambulation and working in house, not associated with chest pain, or shortness of breath, but she feels occasional palpitations as drop beats ( she called it PVC ). Dizziness described as imbalance, but never had any loss of consciousness, no weakness or numbness, no abnormal movements. She has no fever, no chills. She has chronic lower limb edema more on left side.     In the ED Vital Signs T(C): 35.6 HR: 49 BP: 193/71 SpO2: 100%     EKG showed no signs of ischemia, sinus bradycardia with occasional PVC    CTA of the brain showed no acute pathology     admitted to telemetry for further monitoring and evaluation      (2023 22:11)      Cardiology Fellow Notes  Patient with CHF unclear type but likely HFpEF   Is on cardizem for BP per her PCP     Has been having worsening dizziness and lightheadedness unclear if related to exertion   She was on Cardizem which she held 10 days ago and her HR increase to 50s and 60s and she felt better however then it restarted     Her HR would drop to the 30s according to her     Previous Cardiac Workup    Bull's-eye imaging similar findings  Gated SPECT imaging demonstrates normal wall motion thickening and   ejection fraction.  The left ventricular ejection fraction was calculated   to be greater than 55  %.  Impression:  1. IV Adenosine Dual Isotope Study which was negative with respect to   symptoms and EKG changes.  2. Myocardial perfusion imaging reveals no fixed no reperfusion defects  3. Gated imaging reveals normal wall motion thickening and ejection   fraction          PAST MEDICAL & SURGICAL HISTORY  HTN (hypertension)    Sleep apnea    Arthritis pain  b/l knee    High cholesterol    H/O neuropathy    Cancer, colon    Chronic CHF    Cataract    Anxiety and depression    History of cholecystectomy    H/O  section    History of colon resection    H/O cataract extraction  right with iol        FAMILY HISTORY:  FAMILY HISTORY:      SOCIAL HISTORY:  Social History:      ALLERGIES:  No Known Allergies      MEDICATIONS:  enoxaparin Injectable 40 milliGRAM(s) SubCutaneous every 24 hours  furosemide    Tablet 40 milliGRAM(s) Oral daily  lisinopril 20 milliGRAM(s) Oral daily    PRN:  melatonin 5 milliGRAM(s) Oral at bedtime PRN      HOME MEDICATIONS:  Home Medications:  furosemide 40 mg oral tablet: orally 2 times a day (2023 22:20)  traZODone 100 mg oral tablet:  (2023 22:20)      VITALS:   T(F): 96 ( @ 15:41), Max: 97 ( @ 12:03)  HR: 52 ( @ 23:37) (49 - 52)  BP: 158/67 ( @ 23:37) (158/67 - 193/71)  BP(mean): --  RR: 18 ( @ 23:37) (18 - 18)  SpO2: 100% ( @ 23:37) (95% - 100%)    I&O's Summary      REVIEW OF SYSTEMS:  CONSTITUTIONAL: No weakness, fevers or chills  HEENT: No visual changes, neck/ear pain  RESPIRATORY: No cough, sob  CARDIOVASCULAR: See HPI  GASTROINTESTINAL: No abdominal pain. No nausea, vomiting, diarrhea   GENITOURINARY: No dysuria, frequency or hematuria  NEUROLOGICAL: No new focal deficits  SKIN: No new rashes    PHYSICAL EXAM:  General: Not in distress.  Non-toxic appearing.   HEENT: EOMI  Cardio: regular, S1, S2, no murmur  Pulm: B/L BS.  No wheezing / crackles / rales  Abdomen: Soft, non-tender, non-distended. Normoactive bowel sounds  Extremities: No edema b/l le  Neuro: A&O x3. No focal deficits    LABS:                        15.2   10.28 )-----------( 294      ( 2023 14:18 )             46.4         141  |  102  |  14  ----------------------------<  86  3.8   |  29  |  0.8    Ca    10.3      2023 14:18  Mg     2.1         TPro  7.7  /  Alb  3.9  /  TBili  0.7  /  DBili  x   /  AST  21  /  ALT  10  /  AlkPhos  100      PT/INR - ( 2023 14:18 )   PT: 12.30 sec;   INR: 1.08 ratio         PTT - ( 2023 14:18 )  PTT:34.7 sec  Troponin T, Serum: <0.01 ng/mL (23 @ 14:18)  Lactate, Blood: 0.9 mmol/L (23 @ 14:18)    CARDIAC MARKERS ( 2023 14:18 )  x     / <0.01 ng/mL / x     / x     / x            Troponin trend:    EC Lead ECG:   Ventricular Rate 52 BPM    Atrial Rate 52 BPM    P-R Interval 148 ms    QRS Duration 90 ms    Q-T Interval 466 ms    QTC Calculation(Bazett) 433 ms    P Axis 51 degrees    R Axis 102 degrees    T Axis 51 degrees    Diagnosis Line Sinus bradycardia with occasional Premature ventricular complexes  Possible Right ventricular hypertrophy  Septal infarct , age undetermined  Abnormal ECG    Confirmed by Alex Matos ([id number]) on 2023 12:50:06 PM ( @ 12:07)      TELEMETRY EVENTS:  Sinus bradycardia with PVCs    Outpt cardiologist:    Reason for Consult:     HISTORY OF PRESENT ILLNESS:  a 73 year old female with past medical history of HTN, HLD, colon cancer S/P surgery, in remission. Hx of anxiety/depression disorder, CHF?. She was referred to the emergency department from her PCP clinic for further evaluation of bradycardia.     The patient was at her baseline with occasional dizziness coming and resolving on it's own with bradycardia reaching 40's but no symptoms, until 2 weeks when her dizziness started affecting her daily activities, so she stopped taking diltiazem and she felt fine, her heart rate increased to around 48. She went for regular follow up with her PCP clinic who found that her heart rate remained below 50 despite stopping the diltiazem so advised to go to the ED for further evaluation. Her dizziness occur mainly on ambulation and working in house, not associated with chest pain, or shortness of breath, but she feels occasional palpitations as drop beats ( she called it PVC ). Dizziness described as imbalance, but never had any loss of consciousness, no weakness or numbness, no abnormal movements. She has no fever, no chills. She has chronic lower limb edema more on left side.     In the ED Vital Signs T(C): 35.6 HR: 49 BP: 193/71 SpO2: 100%     EKG showed no signs of ischemia, sinus bradycardia with occasional PVC    CTA of the brain showed no acute pathology     admitted to telemetry for further monitoring and evaluation      (2023 22:11)      Cardiology Fellow Notes  Patient with CHF unclear type but likely HFpEF   Is on cardizem for BP per her PCP     Has been having worsening dizziness and lightheadedness unclear if related to exertion   She was on Cardizem which she held 10 days ago and her HR increase to 50s and 60s and she felt better however then it restarted     Her HR would drop to the 30s according to her     Previous Cardiac Workup    Bull's-eye imaging similar findings  Gated SPECT imaging demonstrates normal wall motion thickening and   ejection fraction.  The left ventricular ejection fraction was calculated   to be greater than 55  %.  Impression:  1. IV Adenosine Dual Isotope Study which was negative with respect to   symptoms and EKG changes.  2. Myocardial perfusion imaging reveals no fixed no reperfusion defects  3. Gated imaging reveals normal wall motion thickening and ejection   fraction          PAST MEDICAL & SURGICAL HISTORY  HTN (hypertension)    Sleep apnea    Arthritis pain  b/l knee    High cholesterol    H/O neuropathy    Cancer, colon    Chronic CHF    Cataract    Anxiety and depression    History of cholecystectomy    H/O  section    History of colon resection    H/O cataract extraction  right with iol        FAMILY HISTORY:  FAMILY HISTORY: no family history of premature CAD or SCD      SOCIAL HISTORY:  Social History: no toxic habits      ALLERGIES:  No Known Allergies      MEDICATIONS:  enoxaparin Injectable 40 milliGRAM(s) SubCutaneous every 24 hours  furosemide    Tablet 40 milliGRAM(s) Oral daily  lisinopril 20 milliGRAM(s) Oral daily    PRN:  melatonin 5 milliGRAM(s) Oral at bedtime PRN      HOME MEDICATIONS:  Home Medications:  furosemide 40 mg oral tablet: orally 2 times a day (2023 22:20)  traZODone 100 mg oral tablet:  (2023 22:20)      VITALS:   T(F): 96 ( @ 15:41), Max: 97 ( @ 12:03)  HR: 52 ( @ 23:37) (49 - 52)  BP: 158/67 ( @ 23:37) (158/67 - 193/71)  BP(mean): --  RR: 18 ( @ 23:37) (18 - 18)  SpO2: 100% ( @ 23:37) (95% - 100%)    I&O's Summary      REVIEW OF SYSTEMS:  CONSTITUTIONAL: No weakness, fevers or chills  HEENT: No visual changes, neck/ear pain  RESPIRATORY: No cough, sob  CARDIOVASCULAR: See HPI  GASTROINTESTINAL: No abdominal pain. No nausea, vomiting, diarrhea   GENITOURINARY: No dysuria, frequency or hematuria  NEUROLOGICAL: No new focal deficits  SKIN: No new rashes  10 point ROS completed; negative except as stated in HPI    PHYSICAL EXAM:  General: Not in distress.  Non-toxic appearing.   HEENT: EOMI, PERRLA  Neck: supple, no JVD  Cardio: regular, S1, S2, no murmur  Pulm: B/L BS.  No wheezing / crackles / rales  Abdomen: Soft, non-tender, non-distended. Normoactive bowel sounds  Extremities: No edema b/l le  Neuro: A&O x3. No focal deficits    LABS:                        15.2   10.28 )-----------( 294      ( 2023 14:18 )             46.4         141  |  102  |  14  ----------------------------<  86  3.8   |  29  |  0.8    Ca    10.3      2023 14:18  Mg     2.1         TPro  7.7  /  Alb  3.9  /  TBili  0.7  /  DBili  x   /  AST  21  /  ALT  10  /  AlkPhos  100      PT/INR - ( 2023 14:18 )   PT: 12.30 sec;   INR: 1.08 ratio         PTT - ( 2023 14:18 )  PTT:34.7 sec  Troponin T, Serum: <0.01 ng/mL (23 @ 14:18)  Lactate, Blood: 0.9 mmol/L (23 @ 14:18)    CARDIAC MARKERS ( 2023 14:18 )  x     / <0.01 ng/mL / x     / x     / x            Troponin trend:    EC Lead ECG:   Ventricular Rate 52 BPM    Atrial Rate 52 BPM    P-R Interval 148 ms    QRS Duration 90 ms    Q-T Interval 466 ms    QTC Calculation(Bazett) 433 ms    P Axis 51 degrees    R Axis 102 degrees    T Axis 51 degrees    Diagnosis Line Sinus bradycardia with occasional Premature ventricular complexes  Possible Right ventricular hypertrophy  Septal infarct , age undetermined  Abnormal ECG    Confirmed by Alex Matos ([id number]) on 2023 12:50:06 PM ( @ 12:07)      TELEMETRY EVENTS:  Sinus bradycardia with PVCs

## 2023-06-08 NOTE — RESEARCH COMMUNICATION NOTE - NS AS RESEARCH COMMUNICATION NOTE FT
Subject met resting comfortably in bed, noted to be A&Ox3, english speaking.  Subject #6 was consented for above mentioned clinical trial on 6/8/2023 at 10:08am with an impartial witness present.  Subject #6 met all the inclusion criteria and did not meet any exclusion criteria for the study.  The study was explained; the subject had an opportunity to ask questions, and was given a signed copy of the consent form. Consent was obtained prior to the start of any study procedures.    The device was trialed as per protocol without issue, subject #6 was exited from the study at the completion and was given the stipend as informed consent indicated.  Copy of consent as well as business card of myself left with patient.  Call bell in place, all safety measures in place prior to leaving subject.

## 2023-06-08 NOTE — PROGRESS NOTE ADULT - PROBLEM SELECTOR PLAN 1
in setting of sinus bradycardia  dilt on hold  monitor on tele, pvcs noted  exercise stress for chronotropic incompetence  tte  tsh  orthostatics  appreciate cards  PT

## 2023-06-08 NOTE — PROGRESS NOTE ADULT - SUBJECTIVE AND OBJECTIVE BOX
INTERVAL HPI/OVERNIGHT EVENTS:    SUBJECTIVE: Patient seen and examined at bedside.     cc: dizziness  no cp, sob, abd pain, fever  no ha, dizziness, lightheadedness, syncope    OBJECTIVE:    VITAL SIGNS:  Vital Signs Last 24 Hrs  T(C): 36.8 (2023 08:11), Max: 36.8 (2023 08:11)  T(F): 98.3 (2023 08:11), Max: 98.3 (2023 08:11)  HR: 52 (2023 08:11) (50 - 66)  BP: 188/77 (2023 08:11) (158/67 - 188/77)  BP(mean): --  RR: 18 (2023 08:11) (18 - 18)  SpO2: 100% (2023 08:11) (97% - 100%)    Parameters below as of 2023 08:11  Patient On (Oxygen Delivery Method): room air          PHYSICAL EXAM:    General: NAD  HEENT: NC/AT; PERRL, clear conjunctiva  Neck: supple  Respiratory: CTA b/l  Cardiovascular: +S1/S2; RRR  Abdomen: soft, NT/ND; +BS x4  Extremities: WWP, 2+ peripheral pulses b/l; no LE edema  Skin: normal color and turgor; no rash  Neurological:    MEDICATIONS:  MEDICATIONS  (STANDING):  enoxaparin Injectable 40 milliGRAM(s) SubCutaneous every 24 hours  furosemide    Tablet 40 milliGRAM(s) Oral daily  lisinopril 20 milliGRAM(s) Oral daily    MEDICATIONS  (PRN):  melatonin 5 milliGRAM(s) Oral at bedtime PRN Insomnia      ALLERGIES:  Allergies    No Known Allergies    Intolerances        LABS:                        15.2   10.24 )-----------( 278      ( 2023 08:00 )             46.8     Hemoglobin: 15.2 g/dL ( @ 08:00)  Hemoglobin: 15.2 g/dL ( @ 14:18)    CBC Full  -  ( 2023 08:00 )  WBC Count : 10.24 K/uL  RBC Count : 5.07 M/uL  Hemoglobin : 15.2 g/dL  Hematocrit : 46.8 %  Platelet Count - Automated : 278 K/uL  Mean Cell Volume : 92.3 fL  Mean Cell Hemoglobin : 30.0 pg  Mean Cell Hemoglobin Concentration : 32.5 g/dL  Auto Neutrophil # : 6.50 K/uL  Auto Lymphocyte # : 2.61 K/uL  Auto Monocyte # : 0.87 K/uL  Auto Eosinophil # : 0.20 K/uL  Auto Basophil # : 0.04 K/uL  Auto Neutrophil % : 63.4 %  Auto Lymphocyte % : 25.5 %  Auto Monocyte % : 8.5 %  Auto Eosinophil % : 2.0 %  Auto Basophil % : 0.4 %        143  |  104  |  12  ----------------------------<  87  4.2   |  27  |  0.8    Ca    9.9      2023 08:00  Mg     2.1     -07    TPro  7.8  /  Alb  3.9  /  TBili  0.8  /  DBili  x   /  AST  21  /  ALT  11  /  AlkPhos  100  -08    Creatinine Trend: 0.8<--, 0.8<--  LIVER FUNCTIONS - ( 2023 08:00 )  Alb: 3.9 g/dL / Pro: 7.8 g/dL / ALK PHOS: 100 U/L / ALT: 11 U/L / AST: 21 U/L / GGT: x           PT/INR - ( 2023 14:18 )   PT: 12.30 sec;   INR: 1.08 ratio         PTT - ( 2023 14:18 )  PTT:34.7 sec    hs Troponin:        14:24 - VBG - pH: 7.42  | pCO2: 47    | pO2: 42    | Lactate: 0.80       Urinalysis Basic - ( 2023 17:55 )    Color: Light Yellow / Appearance: Clear / S.017 / pH: x  Gluc: x / Ketone: Negative  / Bili: Negative / Urobili: <2 mg/dL   Blood: x / Protein: Negative / Nitrite: Negative   Leuk Esterase: Small / RBC: 0 /HPF / WBC 5 /HPF   Sq Epi: x / Non Sq Epi: x / Bacteria: Negative      CSF:                      EKG:   MICROBIOLOGY:    IMAGING:      Labs, imaging, EKG personally reviewed    RADIOLOGY & ADDITIONAL TESTS: Reviewed.

## 2023-06-08 NOTE — CONSULT NOTE ADULT - ATTENDING COMMENTS
Briefly, 73 year old woman for whom cardiology is consulted for bradycardia. EKGs and tele reviewed and patient is in sinus bradycardia with PVCs. I do not see any AV block. Patient ruled out for ACS. Monitor on tele. Check echocardiogram. Would recommend exercise stress test if patient is stable enough on her feet to walk to assess chronotropic competence. Check TSH.     Thank you for this consult. Please call/MS teams with questions.

## 2023-06-08 NOTE — PROGRESS NOTE ADULT - ASSESSMENT
73F PMHx HTN, HLD, colon ca s/p resection, depression here with dizziness, lightheadedness, in setting of bradycardia.

## 2023-06-08 NOTE — CONSULT NOTE ADULT - ASSESSMENT
Impression   # Sinus bradycardia with PVCs   Unclear if symptoms related to bradycardia   At bedside HR was 60 and the patient was still dizzy     Recommendations   - Check 2 sets of troponin   - Monitor on tele   - Stop cardizem   - TTE   - Check TSH, A1c  - Consider neuro and ENT eval for light headedness. If workup negative then may consider EP evaluation for PPM     This a preliminary plan. Will need to discuss with attending.   Signature: Lauri RUSHING, 1st year Cardiology Fellow   Impression   # Sinus bradycardia with PVCs   Unclear if symptoms related to bradycardia   At bedside HR was 60 and the patient was still dizzy     Recommendations   - Check 2 sets of troponin   - Monitor on tele   - Stop cardizem   - TTE   - Check TSH, A1c, BNP  - Check exercise stress test to assess chronotrpic compentence   - Consider neuro and ENT eval for light headedness. If workup negative then may consider EP evaluation for PPM     Discussed with attending.   Signature: Lauri RUSHING, 1st year Cardiology Fellow       Impression   # Sinus bradycardia with PVCs   Unclear if symptoms related to bradycardia   At bedside HR was 60 and the patient was still dizzy     Recommendations   - Check 2 sets of troponin   - Monitor on tele   - Stop cardizem   - TTE   - Check TSH, A1c, BNP  - Check exercise stress test to assess chronotropic competence   - Consider neuro and ENT eval for light headedness. If workup negative then may consider EP evaluation.    Discussed with attending.   Signature: Lauri RUSHING, 1st year Cardiology Fellow

## 2023-06-08 NOTE — RESEARCH COMMUNICATION NOTE - NS AS RESEARCH STUDY NAME FT
IRB #   PI: Cain Smart MD  Prospective study to validate the clinical accuracy of Jeferson Device to  measure body heart rate  NCT # [id number]  [employer] # [id number]

## 2023-06-09 ENCOUNTER — TRANSCRIPTION ENCOUNTER (OUTPATIENT)
Age: 73
End: 2023-06-09

## 2023-06-09 VITALS
HEART RATE: 54 BPM | TEMPERATURE: 99 F | RESPIRATION RATE: 18 BRPM | SYSTOLIC BLOOD PRESSURE: 161 MMHG | DIASTOLIC BLOOD PRESSURE: 70 MMHG

## 2023-06-09 LAB
ALBUMIN SERPL ELPH-MCNC: 3.6 G/DL — SIGNIFICANT CHANGE UP (ref 3.5–5.2)
ALP SERPL-CCNC: 93 U/L — SIGNIFICANT CHANGE UP (ref 30–115)
ALT FLD-CCNC: 11 U/L — SIGNIFICANT CHANGE UP (ref 0–41)
ANION GAP SERPL CALC-SCNC: 18 MMOL/L — HIGH (ref 7–14)
AST SERPL-CCNC: 25 U/L — SIGNIFICANT CHANGE UP (ref 0–41)
BASOPHILS # BLD AUTO: 0.03 K/UL — SIGNIFICANT CHANGE UP (ref 0–0.2)
BASOPHILS NFR BLD AUTO: 0.3 % — SIGNIFICANT CHANGE UP (ref 0–1)
BILIRUB SERPL-MCNC: 1 MG/DL — SIGNIFICANT CHANGE UP (ref 0.2–1.2)
BUN SERPL-MCNC: 12 MG/DL — SIGNIFICANT CHANGE UP (ref 10–20)
CALCIUM SERPL-MCNC: 9.3 MG/DL — SIGNIFICANT CHANGE UP (ref 8.4–10.4)
CHLORIDE SERPL-SCNC: 104 MMOL/L — SIGNIFICANT CHANGE UP (ref 98–110)
CO2 SERPL-SCNC: 20 MMOL/L — SIGNIFICANT CHANGE UP (ref 17–32)
CREAT SERPL-MCNC: 0.8 MG/DL — SIGNIFICANT CHANGE UP (ref 0.7–1.5)
EGFR: 77 ML/MIN/1.73M2 — SIGNIFICANT CHANGE UP
EOSINOPHIL # BLD AUTO: 0.19 K/UL — SIGNIFICANT CHANGE UP (ref 0–0.7)
EOSINOPHIL NFR BLD AUTO: 1.9 % — SIGNIFICANT CHANGE UP (ref 0–8)
GLUCOSE SERPL-MCNC: 96 MG/DL — SIGNIFICANT CHANGE UP (ref 70–99)
HCT VFR BLD CALC: 45.3 % — SIGNIFICANT CHANGE UP (ref 37–47)
HGB BLD-MCNC: 15 G/DL — SIGNIFICANT CHANGE UP (ref 12–16)
IMM GRANULOCYTES NFR BLD AUTO: 0.2 % — SIGNIFICANT CHANGE UP (ref 0.1–0.3)
LYMPHOCYTES # BLD AUTO: 2.47 K/UL — SIGNIFICANT CHANGE UP (ref 1.2–3.4)
LYMPHOCYTES # BLD AUTO: 25.3 % — SIGNIFICANT CHANGE UP (ref 20.5–51.1)
MAGNESIUM SERPL-MCNC: 2.1 MG/DL — SIGNIFICANT CHANGE UP (ref 1.8–2.4)
MCHC RBC-ENTMCNC: 30.8 PG — SIGNIFICANT CHANGE UP (ref 27–31)
MCHC RBC-ENTMCNC: 33.1 G/DL — SIGNIFICANT CHANGE UP (ref 32–37)
MCV RBC AUTO: 93 FL — SIGNIFICANT CHANGE UP (ref 81–99)
MONOCYTES # BLD AUTO: 1.11 K/UL — HIGH (ref 0.1–0.6)
MONOCYTES NFR BLD AUTO: 11.3 % — HIGH (ref 1.7–9.3)
NEUTROPHILS # BLD AUTO: 5.96 K/UL — SIGNIFICANT CHANGE UP (ref 1.4–6.5)
NEUTROPHILS NFR BLD AUTO: 61 % — SIGNIFICANT CHANGE UP (ref 42.2–75.2)
NRBC # BLD: 0 /100 WBCS — SIGNIFICANT CHANGE UP (ref 0–0)
PHOSPHATE SERPL-MCNC: 3.2 MG/DL — SIGNIFICANT CHANGE UP (ref 2.1–4.9)
PLATELET # BLD AUTO: 185 K/UL — SIGNIFICANT CHANGE UP (ref 130–400)
PMV BLD: 11.9 FL — HIGH (ref 7.4–10.4)
POTASSIUM SERPL-MCNC: 4.3 MMOL/L — SIGNIFICANT CHANGE UP (ref 3.5–5)
POTASSIUM SERPL-SCNC: 4.3 MMOL/L — SIGNIFICANT CHANGE UP (ref 3.5–5)
PROT SERPL-MCNC: 7.2 G/DL — SIGNIFICANT CHANGE UP (ref 6–8)
RBC # BLD: 4.87 M/UL — SIGNIFICANT CHANGE UP (ref 4.2–5.4)
RBC # FLD: 14.5 % — SIGNIFICANT CHANGE UP (ref 11.5–14.5)
SODIUM SERPL-SCNC: 142 MMOL/L — SIGNIFICANT CHANGE UP (ref 135–146)
WBC # BLD: 9.78 K/UL — SIGNIFICANT CHANGE UP (ref 4.8–10.8)
WBC # FLD AUTO: 9.78 K/UL — SIGNIFICANT CHANGE UP (ref 4.8–10.8)

## 2023-06-09 PROCEDURE — 93016 CV STRESS TEST SUPVJ ONLY: CPT

## 2023-06-09 PROCEDURE — 93312 ECHO TRANSESOPHAGEAL: CPT | Mod: 26

## 2023-06-09 PROCEDURE — 93325 DOPPLER ECHO COLOR FLOW MAPG: CPT | Mod: 26

## 2023-06-09 PROCEDURE — 99239 HOSP IP/OBS DSCHRG MGMT >30: CPT

## 2023-06-09 PROCEDURE — 93320 DOPPLER ECHO COMPLETE: CPT | Mod: 26

## 2023-06-09 PROCEDURE — 93018 CV STRESS TEST I&R ONLY: CPT

## 2023-06-09 RX ORDER — LISINOPRIL 2.5 MG/1
1 TABLET ORAL
Qty: 30 | Refills: 0
Start: 2023-06-09 | End: 2023-07-08

## 2023-06-09 RX ADMIN — Medication 40 MILLIGRAM(S): at 05:43

## 2023-06-09 RX ADMIN — ENOXAPARIN SODIUM 40 MILLIGRAM(S): 100 INJECTION SUBCUTANEOUS at 05:43

## 2023-06-09 RX ADMIN — LISINOPRIL 20 MILLIGRAM(S): 2.5 TABLET ORAL at 05:43

## 2023-06-09 NOTE — PATIENT PROFILE ADULT - FALL HARM RISK - HARM RISK INTERVENTIONS

## 2023-06-09 NOTE — DISCHARGE NOTE PROVIDER - PROVIDER TOKENS
PROVIDER:[TOKEN:[[id number]],FOLLOWUP:[2 weeks]],PROVIDER:[TOKEN:[[id number]],FOLLOWUP:[2 weeks]]

## 2023-06-09 NOTE — DISCHARGE NOTE PROVIDER - HOSPITAL COURSE
a 73 year old female with past medical history of HTN, HLD, colon cancer S/P surgery, in remission. Hx of anxiety/depression disorder, CHF?. She was referred to the emergency department from her PCP clinic for further evaluation of bradycardia.     The patient was at her baseline with occasional dizziness coming and resolving on it's own with bradycardia reaching 40's but no symptoms, until 2 weeks when her dizziness started affecting her daily activities, so she stopped taking diltiazem and she felt fine, her heart rate increased to around 48. She went for regular follow up with her PCP clinic who found that her heart rate remained below 50 despite stopping the diltiazem so advised to go to the ED for further evaluation. Her dizziness occur mainly on ambulation and working in house, not associated with chest pain, or shortness of breath, but she feels occasional palpitations as drop beats ( she called it PVC ). Dizziness described as imbalance, but never had any loss of consciousness, no weakness or numbness, no abnormal movements. She has no fever, no chills. She has chronic lower limb edema more on left side.     In the ED Vital Signs T(C): 35.6 HR: 49 BP: 193/71 SpO2: 100%     EKG showed no signs of ischemia, sinus bradycardia with occasional PVC    CTA of the brain showed no acute pathology     admitted to telemetry for further monitoring and evaluation   echo 6/9     Summary:   1. Normalglobal left ventricular systolic function.   2. LV Ejection Fraction by Russell's Method with a biplane EF of 73 %.   3. Normal left ventricular internal cavity size.   4. Spectral Doppler shows impaired relaxation pattern of left   ventricular myocardial filling (Grade I diastolic dysfunction).   5. Normal left atrial size.   6. Normal right atrial size.   7. Mild thickening of the anterior and posterior mitral valve leaflets.   8. No evidence of mitral valve regurgitation.   9. Sclerotic aorticvalve with normal opening.  10. Mild pulmonic valve regurgitation.      · Assessment    a 73 year old female with past medical history of HTN, HLD, colon cancer S/P surgery, in remission. Hx of anxiety/depression disorder, CHF?. She was referred to the emergency department from her PCP clinic for further evaluation of bradycardia. Pt C/O feeling dizzy for the last two weeks. She was on Diltiazem which was stopped.     1.Symptomatic Bradycardia likely due to Cardizem  2.HTN / DL  3.H/O Colon Ca  4.Anxiety / Depression  5.Morbid obesity / likely TACOS             PLAN:    ·Tele reviewed. HR is in 70's. At nigh HR was in 40's and pt was sleeping.   ·Pt stopped taking Cardizem more than a week ago and feels better now.   ·EKG: Sinus bradycardia @ 55/min. Q waves in V2-V3 (Interpreted by me)  ·CT head is unremarkable  ·CTA head and neck are unremarkable  ·Venous doppler of LE is negative for DVT  ·Cardiology eval noted. Recommended EST to find if there is chronotropic incompetence.   ·S/P EST. Achieved 78% of the expected HR during exercise.   ·CT head, CTA head and neck are unremarkable.    ·TSH is normal  ·ECHO reviewed. EF is 73%. No wall motion abnormalities.   ·BP is high. Will add Lisinopril 10 mg po qhs to control BP  ·If cleared by cardiology will d/c her home today

## 2023-06-09 NOTE — DISCHARGE NOTE PROVIDER - NSDCCPCAREPLAN_GEN_ALL_CORE_FT
PRINCIPAL DISCHARGE DIAGNOSIS  Diagnosis: Sinus bradycardia  Assessment and Plan of Treatment: You came to the hospital for evaluation of a slow heart rate. Evaluation in the emergency department with an ecg showed sinus bradycardia. You were examined by the cardiology team who reccomended an exercise stress test which was not grossly evident of any acute pathology. You are stable for discharge. Take all medications as prescribed and follow up with [facility] pcp and cardiologist.

## 2023-06-09 NOTE — PROGRESS NOTE ADULT - SUBJECTIVE AND OBJECTIVE BOX
EDY MANZANO  73y Female    CHIEF COMPLAINT:    Patient is a 73y old  Female who presents with a chief complaint of symptomatic bradycardia (08 Jun 2023 13:13)      INTERVAL HPI/OVERNIGHT EVENTS:    Patient seen and examined.    ROS: All other systems are negative.    Vital Signs:    T(F): 96.7 (06-09-23 @ 04:53), Max: 98.6 (06-08-23 @ 16:42)  HR: 54 (06-09-23 @ 05:48) (52 - 68)  BP: 161/70 (06-09-23 @ 05:48) (132/63 - 188/77)  RR: 18 (06-09-23 @ 04:53) (18 - 18)  SpO2: 100% (06-08-23 @ 16:42) (100% - 100%)  I&O's Summary    Daily Height in cm: 160.02 (08 Jun 2023 21:59)    Daily   CAPILLARY BLOOD GLUCOSE          PHYSICAL EXAM:    GENERAL:  NAD  SKIN: No rashes or lesions  HENT: Atraumatic. Normocephalic. PERRL. Moist membranes.  NECK: Supple, No JVD. No lymphadenopathy.  PULMONARY: CTA B/L. No wheezing. No rales  CVS: Normal S1, S2. Rate and Rhythm are regular. No murmurs.  ABDOMEN/GI: Soft, Nontender, Nondistended; BS present  EXTREMITIES: Peripheral pulses intact. No edema B/L LE.  NEUROLOGIC:  No motor or sensory deficit.  PSYCH: Alert & oriented x 3    Consultant(s) Notes Reviewed:  [x ] YES  [ ] NO  Care Discussed with Consultants/Other Providers [ x] YES  [ ] NO    EKG reviewed  Telemetry reviewed    LABS:                        15.2   10.24 )-----------( 278      ( 08 Jun 2023 08:00 )             46.8     06-08    143  |  104  |  12  ----------------------------<  87  4.2   |  27  |  0.8    Ca    9.9      08 Jun 2023 08:00  Mg     2.1     06-07    TPro  7.8  /  Alb  3.9  /  TBili  0.8  /  DBili  x   /  AST  21  /  ALT  11  /  AlkPhos  100  06-08    PT/INR - ( 07 Jun 2023 14:18 )   PT: 12.30 sec;   INR: 1.08 ratio         PTT - ( 07 Jun 2023 14:18 )  PTT:34.7 sec    Trop <0.01, CKMB --, CK --, 06-08-23 @ 08:00  Trop <0.01, CKMB --, CK --, 06-07-23 @ 14:18        RADIOLOGY & ADDITIONAL TESTS:    < from: Xray Chest 1 View- PORTABLE-Routine (06.08.23 @ 03:29) >    Impression:    No acute abnormality on frontal chest radiograph.    < end of copied text >  < from: CT Head No Cont (06.07.23 @ 14:52) >    IMPRESSION:    No evidence of acute intracranial pathology.    < end of copied text >  < from: CT Angio Head w/ IV Cont (06.07.23 @ 17:24) >    IMPRESSION:    No evidence of major vascular stenosis, occlusion, or aneurysm.      < end of copied text >  < from: CT Angio Neck w/ IV Cont (06.07.23 @ 17:24) >    IMPRESSION:    No evidence of major vascular stenosis, occlusion, or aneurysm.    < end of copied text >    Imaging or report Personally Reviewed:  [x ] YES  [ ] NO    Medications:  Standing  enoxaparin Injectable 40 milliGRAM(s) SubCutaneous every 24 hours  furosemide    Tablet 40 milliGRAM(s) Oral daily  lisinopril 20 milliGRAM(s) Oral daily    PRN Meds  melatonin 5 milliGRAM(s) Oral at bedtime PRN      Case discussed with resident    Care discussed with pt/family           EDY MANZANO  73y Female    CHIEF COMPLAINT:    Patient is a 73y old  Female who presents with a chief complaint of symptomatic bradycardia (08 Jun 2023 13:13)      INTERVAL HPI/OVERNIGHT EVENTS:    Patient seen and examined. Denies any dizziness or palpitations. No cp. No sob. BP is running high.        ROS: All other systems are negative.    Vital Signs:    T(F): 96.7 (06-09-23 @ 04:53), Max: 98.6 (06-08-23 @ 16:42)  HR: 54 (06-09-23 @ 05:48) (52 - 68)  BP: 161/70 (06-09-23 @ 05:48) (132/63 - 188/77)  RR: 18 (06-09-23 @ 04:53) (18 - 18)  SpO2: 100% (06-08-23 @ 16:42) (100% - 100%)  I&O's Summary    Daily Height in cm: 160.02 (08 Jun 2023 21:59)    Daily   CAPILLARY BLOOD GLUCOSE          PHYSICAL EXAM:    GENERAL:  NAD  SKIN: No rashes or lesions  HENT: Atraumatic. Normocephalic. PERRL. Moist membranes.  NECK: Supple, No JVD. No lymphadenopathy.  PULMONARY: CTA B/L. No wheezing. No rales  CVS: Normal S1, S2. Rate and Rhythm are regular. No murmurs.  ABDOMEN/GI: Soft, Nontender, Nondistended; BS present  EXTREMITIES: Peripheral pulses intact. No edema B/L LE.  NEUROLOGIC:  No motor or sensory deficit.  PSYCH: Alert & oriented x 3    Consultant(s) Notes Reviewed:  [x ] YES  [ ] NO  Care Discussed with Consultants/Other Providers [ x] YES  [ ] NO    EKG reviewed  Telemetry reviewed    LABS:                        15.2   10.24 )-----------( 278      ( 08 Jun 2023 08:00 )             46.8     06-08    143  |  104  |  12  ----------------------------<  87  4.2   |  27  |  0.8    Ca    9.9      08 Jun 2023 08:00  Mg     2.1     06-07    TPro  7.8  /  Alb  3.9  /  TBili  0.8  /  DBili  x   /  AST  21  /  ALT  11  /  AlkPhos  100  06-08    PT/INR - ( 07 Jun 2023 14:18 )   PT: 12.30 sec;   INR: 1.08 ratio         PTT - ( 07 Jun 2023 14:18 )  PTT:34.7 sec    Trop <0.01, CKMB --, CK --, 06-08-23 @ 08:00  Trop <0.01, CKMB --, CK --, 06-07-23 @ 14:18        RADIOLOGY & ADDITIONAL TESTS:    < from: Xray Chest 1 View- PORTABLE-Routine (06.08.23 @ 03:29) >    Impression:    No acute abnormality on frontal chest radiograph.    < end of copied text >  < from: CT Head No Cont (06.07.23 @ 14:52) >    IMPRESSION:    No evidence of acute intracranial pathology.    < end of copied text >  < from: CT Angio Head w/ IV Cont (06.07.23 @ 17:24) >    IMPRESSION:    No evidence of major vascular stenosis, occlusion, or aneurysm.      < end of copied text >  < from: CT Angio Neck w/ IV Cont (06.07.23 @ 17:24) >    IMPRESSION:    No evidence of major vascular stenosis, occlusion, or aneurysm.    < end of copied text >    Imaging or report Personally Reviewed:  [x ] YES  [ ] NO    Medications:  Standing  enoxaparin Injectable 40 milliGRAM(s) SubCutaneous every 24 hours  furosemide    Tablet 40 milliGRAM(s) Oral daily  lisinopril 20 milliGRAM(s) Oral daily    PRN Meds  melatonin 5 milliGRAM(s) Oral at bedtime PRN      Case discussed with resident    Care discussed with pt/family           EDY MANZANO  73y Female    CHIEF COMPLAINT:    Patient is a 73y old  Female who presents with a chief complaint of symptomatic bradycardia (08 Jun 2023 13:13)      INTERVAL HPI/OVERNIGHT EVENTS:    Patient seen and examined. Denies any dizziness or palpitations. No cp. No sob. BP is running high.        ROS: All other systems are negative.    Vital Signs:    T(F): 96.7 (06-09-23 @ 04:53), Max: 98.6 (06-08-23 @ 16:42)  HR: 54 (06-09-23 @ 05:48) (52 - 68)  BP: 161/70 (06-09-23 @ 05:48) (132/63 - 188/77)  RR: 18 (06-09-23 @ 04:53) (18 - 18)  SpO2: 100% (06-08-23 @ 16:42) (100% - 100%)  I&O's Summary    Daily Height in cm: 160.02 (08 Jun 2023 21:59)    Daily   CAPILLARY BLOOD GLUCOSE          PHYSICAL EXAM:    GENERAL:  NAD  SKIN: No rashes or lesions  HENT: Atraumatic. Normocephalic. PERRL. Moist membranes.  NECK: Supple, No JVD. No lymphadenopathy.  PULMONARY: CTA B/L. No wheezing. No rales  CVS: Normal S1, S2. Rate and Rhythm are regular. No murmurs.  ABDOMEN/GI: Soft, Nontender, Nondistended; BS present  EXTREMITIES: Peripheral pulses intact. No edema B/L LE.  NEUROLOGIC:  No motor or sensory deficit.  PSYCH: Alert & oriented x 3    Consultant(s) Notes Reviewed:  [x ] YES  [ ] NO  Care Discussed with Consultants/Other Providers [ x] YES  [ ] NO    EKG reviewed  Telemetry reviewed    LABS:                        15.2   10.24 )-----------( 278      ( 08 Jun 2023 08:00 )             46.8     06-08    143  |  104  |  12  ----------------------------<  87  4.2   |  27  |  0.8    Ca    9.9      08 Jun 2023 08:00  Mg     2.1     06-07    TPro  7.8  /  Alb  3.9  /  TBili  0.8  /  DBili  x   /  AST  21  /  ALT  11  /  AlkPhos  100  06-08    PT/INR - ( 07 Jun 2023 14:18 )   PT: 12.30 sec;   INR: 1.08 ratio         PTT - ( 07 Jun 2023 14:18 )  PTT:34.7 sec    Trop <0.01, CKMB --, CK --, 06-08-23 @ 08:00  Trop <0.01, CKMB --, CK --, 06-07-23 @ 14:18        RADIOLOGY & ADDITIONAL TESTS:    < from: Xray Chest 1 View- PORTABLE-Routine (06.08.23 @ 03:29) >    Impression:    No acute abnormality on frontal chest radiograph.    < end of copied text >  < from: CT Head No Cont (06.07.23 @ 14:52) >    IMPRESSION:    No evidence of acute intracranial pathology.    < end of copied text >  < from: CT Angio Head w/ IV Cont (06.07.23 @ 17:24) >    IMPRESSION:    No evidence of major vascular stenosis, occlusion, or aneurysm.      < end of copied text >  < from: CT Angio Neck w/ IV Cont (06.07.23 @ 17:24) >    IMPRESSION:    No evidence of major vascular stenosis, occlusion, or aneurysm.    < end of copied text >  < from: TTE Echo Complete w/ Contrast w/o Doppler (06.09.23 @ 08:49) >  Summary:   1. Normalglobal left ventricular systolic function.   2. LV Ejection Fraction by Russell's Method with a biplane EF of 73 %.   3. Normal left ventricular internal cavity size.   4. Spectral Doppler shows impaired relaxation pattern of left   ventricular myocardial filling (Grade I diastolic dysfunction).   5. Normal left atrial size.   6. Normal right atrial size.   7. Mild thickening of the anterior and posterior mitral valve leaflets.   8. No evidence of mitral valve regurgitation.   9. Sclerotic aorticvalve with normal opening.  10. Mild pulmonic valve regurgitation.    < end of copied text >    Imaging or report Personally Reviewed:  [x ] YES  [ ] NO    Medications:  Standing  enoxaparin Injectable 40 milliGRAM(s) SubCutaneous every 24 hours  furosemide    Tablet 40 milliGRAM(s) Oral daily  lisinopril 20 milliGRAM(s) Oral daily    PRN Meds  melatonin 5 milliGRAM(s) Oral at bedtime PRN      Case discussed with resident    Care discussed with pt/family

## 2023-06-09 NOTE — DISCHARGE NOTE NURSING/CASE MANAGEMENT/SOCIAL WORK - NSDCPEFALRISK_GEN_ALL_CORE
For information on Fall & Injury Prevention, visit: [email or web address] OR  [email or web address] OR  https://www.cdc.gov/steadi/patient.html

## 2023-06-09 NOTE — DISCHARGE NOTE NURSING/CASE MANAGEMENT/SOCIAL WORK - PATIENT PORTAL LINK FT
You can access the FollowMyHealth Patient Portal offered by [facility] by registering at the following website: [email or web address]. By joining [employer] [facility]’s FollowMyHealth portal, you will also be able to view your health information using other applications (apps) compatible with our system.

## 2023-06-09 NOTE — DISCHARGE NOTE PROVIDER - NSDCMRMEDTOKEN_GEN_ALL_CORE_FT
furosemide 40 mg oral tablet: orally 2 times a day  lisinopril 10 mg oral tablet: 1 tab(s) orally  traZODone 100 mg oral tablet:

## 2023-06-09 NOTE — DISCHARGE NOTE PROVIDER - CARE PROVIDER_API CALL
Behuria, [facility]  Cardiology  [address], [address]  [place] [address]  Phone: [phone]  Fax: [phone]  Follow Up Time: 2 weeks    [facility] Medicine  [address]  [place] [address]  Phone: [phone]  Fax: [phone]  Follow Up Time: 2 weeks

## 2023-06-09 NOTE — PROGRESS NOTE ADULT - ASSESSMENT
a 73 year old female with past medical history of HTN, HLD, colon cancer S/P surgery, in remission. Hx of anxiety/depression disorder, CHF?. She was referred to the emergency department from her PCP clinic for further evaluation of bradycardia. Pt C/O feeling dizzy for the last two weeks. She was on Diltiazem which was stopped.     Symptomatic Bradycardia  HTN / Dl  H/O Colon Ca  Anxiety / Depression             PLAN:    ·	Tele reviewed  ·	EKG: Sinus bradycardia @ 55/min. Q waves in V2-V3 (Interpreted by me)  ·	CT head is unremarkable  ·	CTA head and neck are unremarkable  ·	Venous doppler of LE is negative for DVT a 73 year old female with past medical history of HTN, HLD, colon cancer S/P surgery, in remission. Hx of anxiety/depression disorder, CHF?. She was referred to the emergency department from her PCP clinic for further evaluation of bradycardia. Pt C/O feeling dizzy for the last two weeks. She was on Diltiazem which was stopped.     Symptomatic Bradycardia likely due to Cardizem  HTN / DL  H/O Colon Ca  Anxiety / Depression  Morbid obesity / likely TACOS             PLAN:    ·	Tele reviewed. HR is in 70's. At [facility] HR was in 40's and pt was sleeping.   ·	Pt stopped taking Cardizem more than a week ago and feels better now.   ·	EKG: Sinus bradycardia @ 55/min. Q waves in V2-V3 (Interpreted by me)  ·	CT head is unremarkable  ·	CTA head and neck are unremarkable  ·	Venous doppler of LE is negative for DVT  ·	Cardiology eval noted. Recommended EST to find if there is chronotropic incompetence.   ·	S/P EST. Achieved 78% of the expected HR during exercise.   ·	CT head, CTA head and neck are unremarkable.    ·	TSH is normal  ·	ECHO reviewed. EF is 73%. No wall motion abnormalities.   ·	BP is high. Will add Lisinopril 10 mg po qhs to control BP  ·	If cleared by cardiology will d/c her home today    * Med rec reviewed. Plan of care d/w the pt. Time spent 41 minutes.

## 2023-06-09 NOTE — DISCHARGE NOTE PROVIDER - ATTENDING ATTESTATION STATEMENT
CBE, Pap, Pelvic, check labs and call with results, order nuva ring and gave samples, start OTC pepcid and monitor nausea, HCG negative, pending urine culture, erx flagyl 500 mg BID x 1 wk, no alcohol   I have personally seen and examined the patient. I have collaborated with and supervised the

## 2023-06-13 DIAGNOSIS — Z00.6 ENCOUNTER FOR EXAMINATION FOR NORMAL COMPARISON AND CONTROL IN CLINICAL RESEARCH PROGRAM: ICD-10-CM

## 2023-08-30 PROBLEM — Z00.00 ENCOUNTER FOR PREVENTIVE HEALTH EXAMINATION: Status: ACTIVE | Noted: 2023-08-30

## 2024-01-01 NOTE — ASU DISCHARGE PLAN (ADULT/PEDIATRIC) - NS MD DC FALL RISK RISK
For information on Fall & Injury Prevention, visit: [email or web address] OR  [email or web address] OR  https://www.cdc.gov/steadi/patient.html - Follow-up with your pediatrician within 48 hours of discharge.   Routine Home Care Instructions:  - Please call us for help if you feel sad, blue or overwhelmed for more than a few days after discharge  - Umbilical cord care:        - Please keep your baby's cord clean and dry (do not apply alcohol)        - Please keep your baby's diaper below the umbilical cord until it has fallen off (~10-14 days)        - Please do not submerge your baby in a bath until the cord has fallen off (sponge bath instead)  - Continue feeding your child on demand at all times. Your child should have 8-12 proper feedings each day.  - Breastfeeding babies generally regain their birth-weight within 2 weeks. Thus, it is important for you to follow-up with your pediatrician within 48 hours of discharge and then again at 2 weeks of birth in order to make sure your baby has passed his/her birth-weight.  Please contact your pediatrician and return to the hospital if you notice any of the following:   - Fever  (T > 100.4)  - Reduced amount of wet diapers (< 5-6 per day) or no wet diaper in 12 hours  - Increased fussiness, irritability, or crying inconsolably  - Lethargy (excessively sleepy, difficult to arouse)  - Breathing difficulties (noisy breathing, breathing fast, using belly and neck muscles to breath)  - Changes in the baby’s color (yellow, blue, pale, gray)  - Seizure or loss of consciousness

## 2024-09-09 ENCOUNTER — EMERGENCY (EMERGENCY)
Facility: HOSPITAL | Age: 74
LOS: 0 days | Discharge: HOME | End: 2024-09-10
Attending: EMERGENCY MEDICINE
Payer: MEDICARE

## 2024-09-09 ENCOUNTER — TRANSCRIPTION ENCOUNTER (OUTPATIENT)
Age: 74
End: 2024-09-09

## 2024-09-09 VITALS
HEIGHT: 63 IN | RESPIRATION RATE: 18 BRPM | HEART RATE: 60 BPM | WEIGHT: 238.1 LBS | OXYGEN SATURATION: 96 % | SYSTOLIC BLOOD PRESSURE: 152 MMHG | DIASTOLIC BLOOD PRESSURE: 70 MMHG | TEMPERATURE: 97 F

## 2024-09-09 VITALS
TEMPERATURE: 98 F | DIASTOLIC BLOOD PRESSURE: 71 MMHG | OXYGEN SATURATION: 97 % | HEART RATE: 48 BPM | SYSTOLIC BLOOD PRESSURE: 145 MMHG | RESPIRATION RATE: 18 BRPM

## 2024-09-09 DIAGNOSIS — Z90.49 ACQUIRED ABSENCE OF OTHER SPECIFIED PARTS OF DIGESTIVE TRACT: Chronic | ICD-10-CM

## 2024-09-09 DIAGNOSIS — R30.0 DYSURIA: ICD-10-CM

## 2024-09-09 DIAGNOSIS — I50.9 HEART FAILURE, UNSPECIFIED: ICD-10-CM

## 2024-09-09 DIAGNOSIS — R42 DIZZINESS AND GIDDINESS: ICD-10-CM

## 2024-09-09 DIAGNOSIS — G47.33 OBSTRUCTIVE SLEEP APNEA (ADULT) (PEDIATRIC): ICD-10-CM

## 2024-09-09 DIAGNOSIS — N39.0 URINARY TRACT INFECTION, SITE NOT SPECIFIED: ICD-10-CM

## 2024-09-09 DIAGNOSIS — R00.1 BRADYCARDIA, UNSPECIFIED: ICD-10-CM

## 2024-09-09 DIAGNOSIS — Z98.891 HISTORY OF UTERINE SCAR FROM PREVIOUS SURGERY: Chronic | ICD-10-CM

## 2024-09-09 DIAGNOSIS — E78.5 HYPERLIPIDEMIA, UNSPECIFIED: ICD-10-CM

## 2024-09-09 DIAGNOSIS — Z98.49 CATARACT EXTRACTION STATUS, UNSPECIFIED EYE: Chronic | ICD-10-CM

## 2024-09-09 DIAGNOSIS — R35.0 FREQUENCY OF MICTURITION: ICD-10-CM

## 2024-09-09 DIAGNOSIS — E66.9 OBESITY, UNSPECIFIED: ICD-10-CM

## 2024-09-09 DIAGNOSIS — N18.9 CHRONIC KIDNEY DISEASE, UNSPECIFIED: ICD-10-CM

## 2024-09-09 LAB
ALBUMIN SERPL ELPH-MCNC: 3.6 G/DL — SIGNIFICANT CHANGE UP (ref 3.5–5.2)
ALP SERPL-CCNC: 95 U/L — SIGNIFICANT CHANGE UP (ref 30–115)
ALT FLD-CCNC: 13 U/L — SIGNIFICANT CHANGE UP (ref 0–41)
ANION GAP SERPL CALC-SCNC: 11 MMOL/L — SIGNIFICANT CHANGE UP (ref 7–14)
AST SERPL-CCNC: 24 U/L — SIGNIFICANT CHANGE UP (ref 0–41)
BASOPHILS # BLD AUTO: 0.07 K/UL — SIGNIFICANT CHANGE UP (ref 0–0.2)
BASOPHILS NFR BLD AUTO: 0.7 % — SIGNIFICANT CHANGE UP (ref 0–1)
BILIRUB SERPL-MCNC: 0.3 MG/DL — SIGNIFICANT CHANGE UP (ref 0.2–1.2)
BUN SERPL-MCNC: 18 MG/DL — SIGNIFICANT CHANGE UP (ref 10–20)
CALCIUM SERPL-MCNC: 10.1 MG/DL — SIGNIFICANT CHANGE UP (ref 8.4–10.5)
CHLORIDE SERPL-SCNC: 104 MMOL/L — SIGNIFICANT CHANGE UP (ref 98–110)
CO2 SERPL-SCNC: 26 MMOL/L — SIGNIFICANT CHANGE UP (ref 17–32)
CREAT SERPL-MCNC: 1.2 MG/DL — SIGNIFICANT CHANGE UP (ref 0.7–1.5)
EGFR: 47 ML/MIN/1.73M2 — LOW
EOSINOPHIL # BLD AUTO: 0.51 K/UL — SIGNIFICANT CHANGE UP (ref 0–0.7)
EOSINOPHIL NFR BLD AUTO: 5 % — SIGNIFICANT CHANGE UP (ref 0–8)
GLUCOSE SERPL-MCNC: 94 MG/DL — SIGNIFICANT CHANGE UP (ref 70–99)
HCT VFR BLD CALC: 44.2 % — SIGNIFICANT CHANGE UP (ref 37–47)
HGB BLD-MCNC: 14.4 G/DL — SIGNIFICANT CHANGE UP (ref 12–16)
IMM GRANULOCYTES NFR BLD AUTO: 0.2 % — SIGNIFICANT CHANGE UP (ref 0.1–0.3)
LYMPHOCYTES # BLD AUTO: 3.83 K/UL — HIGH (ref 1.2–3.4)
LYMPHOCYTES # BLD AUTO: 37.7 % — SIGNIFICANT CHANGE UP (ref 20.5–51.1)
MAGNESIUM SERPL-MCNC: 2 MG/DL — SIGNIFICANT CHANGE UP (ref 1.8–2.4)
MCHC RBC-ENTMCNC: 28.7 PG — SIGNIFICANT CHANGE UP (ref 27–31)
MCHC RBC-ENTMCNC: 32.6 G/DL — SIGNIFICANT CHANGE UP (ref 32–37)
MCV RBC AUTO: 88 FL — SIGNIFICANT CHANGE UP (ref 81–99)
MONOCYTES # BLD AUTO: 0.95 K/UL — HIGH (ref 0.1–0.6)
MONOCYTES NFR BLD AUTO: 9.3 % — SIGNIFICANT CHANGE UP (ref 1.7–9.3)
NEUTROPHILS # BLD AUTO: 4.79 K/UL — SIGNIFICANT CHANGE UP (ref 1.4–6.5)
NEUTROPHILS NFR BLD AUTO: 47.1 % — SIGNIFICANT CHANGE UP (ref 42.2–75.2)
NRBC # BLD: 0 /100 WBCS — SIGNIFICANT CHANGE UP (ref 0–0)
PLATELET # BLD AUTO: 195 K/UL — SIGNIFICANT CHANGE UP (ref 130–400)
PMV BLD: 11 FL — HIGH (ref 7.4–10.4)
POTASSIUM SERPL-MCNC: 4.7 MMOL/L — SIGNIFICANT CHANGE UP (ref 3.5–5)
POTASSIUM SERPL-SCNC: 4.7 MMOL/L — SIGNIFICANT CHANGE UP (ref 3.5–5)
PROT SERPL-MCNC: 7.3 G/DL — SIGNIFICANT CHANGE UP (ref 6–8)
RBC # BLD: 5.02 M/UL — SIGNIFICANT CHANGE UP (ref 4.2–5.4)
RBC # FLD: 13.5 % — SIGNIFICANT CHANGE UP (ref 11.5–14.5)
SODIUM SERPL-SCNC: 141 MMOL/L — SIGNIFICANT CHANGE UP (ref 135–146)
WBC # BLD: 10.17 K/UL — SIGNIFICANT CHANGE UP (ref 4.8–10.8)
WBC # FLD AUTO: 10.17 K/UL — SIGNIFICANT CHANGE UP (ref 4.8–10.8)

## 2024-09-09 PROCEDURE — 70450 CT HEAD/BRAIN W/O DYE: CPT | Mod: 26,MC

## 2024-09-09 PROCEDURE — 80053 COMPREHEN METABOLIC PANEL: CPT

## 2024-09-09 PROCEDURE — 87186 SC STD MICRODIL/AGAR DIL: CPT

## 2024-09-09 PROCEDURE — 81001 URINALYSIS AUTO W/SCOPE: CPT

## 2024-09-09 PROCEDURE — 71045 X-RAY EXAM CHEST 1 VIEW: CPT

## 2024-09-09 PROCEDURE — 93010 ELECTROCARDIOGRAM REPORT: CPT

## 2024-09-09 PROCEDURE — 85025 COMPLETE CBC W/AUTO DIFF WBC: CPT

## 2024-09-09 PROCEDURE — 70450 CT HEAD/BRAIN W/O DYE: CPT | Mod: MC

## 2024-09-09 PROCEDURE — 99285 EMERGENCY DEPT VISIT HI MDM: CPT

## 2024-09-09 PROCEDURE — 84484 ASSAY OF TROPONIN QUANT: CPT

## 2024-09-09 PROCEDURE — 83735 ASSAY OF MAGNESIUM: CPT

## 2024-09-09 PROCEDURE — 87086 URINE CULTURE/COLONY COUNT: CPT

## 2024-09-09 PROCEDURE — 87077 CULTURE AEROBIC IDENTIFY: CPT

## 2024-09-09 PROCEDURE — 71045 X-RAY EXAM CHEST 1 VIEW: CPT | Mod: 26

## 2024-09-09 PROCEDURE — 99285 EMERGENCY DEPT VISIT HI MDM: CPT | Mod: 25

## 2024-09-09 PROCEDURE — 93005 ELECTROCARDIOGRAM TRACING: CPT

## 2024-09-09 PROCEDURE — 36415 COLL VENOUS BLD VENIPUNCTURE: CPT

## 2024-09-10 LAB
APPEARANCE UR: CLEAR — SIGNIFICANT CHANGE UP
BACTERIA # UR AUTO: ABNORMAL /HPF
BILIRUB UR-MCNC: NEGATIVE — SIGNIFICANT CHANGE UP
CAST: 3 /LPF — SIGNIFICANT CHANGE UP (ref 0–4)
COLOR SPEC: YELLOW — SIGNIFICANT CHANGE UP
DIFF PNL FLD: NEGATIVE — SIGNIFICANT CHANGE UP
GLUCOSE UR QL: NEGATIVE MG/DL — SIGNIFICANT CHANGE UP
KETONES UR-MCNC: NEGATIVE MG/DL — SIGNIFICANT CHANGE UP
LEUKOCYTE ESTERASE UR-ACNC: ABNORMAL
NITRITE UR-MCNC: NEGATIVE — SIGNIFICANT CHANGE UP
PH UR: 5.5 — SIGNIFICANT CHANGE UP (ref 5–8)
PROT UR-MCNC: NEGATIVE MG/DL — SIGNIFICANT CHANGE UP
RBC CASTS # UR COMP ASSIST: 1 /HPF — SIGNIFICANT CHANGE UP (ref 0–4)
SP GR SPEC: 1.01 — SIGNIFICANT CHANGE UP (ref 1–1.03)
SQUAMOUS # UR AUTO: 5 /HPF — SIGNIFICANT CHANGE UP (ref 0–5)
TROPONIN T, HIGH SENSITIVITY RESULT: 10 NG/L — SIGNIFICANT CHANGE UP (ref 6–13)
URATE CRY FLD QL MICRO: PRESENT
UROBILINOGEN FLD QL: 0.2 MG/DL — SIGNIFICANT CHANGE UP (ref 0.2–1)
WBC UR QL: 54 /HPF — HIGH (ref 0–5)
YEAST-LIKE CELLS: PRESENT

## 2024-09-10 RX ORDER — FLUCONAZOLE 150 MG/1
150 TABLET ORAL ONCE
Refills: 0 | Status: COMPLETED | OUTPATIENT
Start: 2024-09-10 | End: 2024-09-10

## 2024-09-10 RX ORDER — CEFUROXIME SODIUM 1.5 G
500 VIAL (EA) INJECTION ONCE
Refills: 0 | Status: COMPLETED | OUTPATIENT
Start: 2024-09-10 | End: 2024-09-10

## 2024-09-10 RX ORDER — CEFUROXIME SODIUM 1.5 G
1 VIAL (EA) INJECTION
Qty: 14 | Refills: 0
Start: 2024-09-10 | End: 2024-09-16

## 2024-09-10 RX ADMIN — Medication 500 MILLIGRAM(S): at 02:53

## 2024-09-10 RX ADMIN — FLUCONAZOLE 150 MILLIGRAM(S): 150 TABLET ORAL at 02:53

## 2024-09-10 NOTE — ED PROVIDER NOTE - PATIENT PORTAL LINK FT
You can access the FollowMyHealth Patient Portal offered by [facility] by registering at the following website: [email or web address]. By joining [employer]’s FollowMyHealth portal, you will also be able to view your health information using other applications (apps) compatible with our system.

## 2024-09-10 NOTE — ED ADULT NURSE NOTE - OBJECTIVE STATEMENT
BIBEMS from [facility], for UTI and low heart rate w/ dizziness.

## 2024-09-10 NOTE — ED PROVIDER NOTE - PROVIDER TOKENS
PROVIDER:[TOKEN:[[id number]],FOLLOWUP:[7-10 Days],ESTABLISHEDPATIENT:[T]],PROVIDER:[TOKEN:[[id number]],FOLLOWUP:[7-10 Days],ESTABLISHEDPATIENT:[T]]

## 2024-09-10 NOTE — ED PROVIDER NOTE - OBJECTIVE STATEMENT
74F pmh htn, hl, colon CA s/p resection in remission, chf on lasix 40mg bid, dustin, obesity, np, ckd, sinus bradycardia in past referred from outside UC for bradycardia. Pt rpts dysuria & frequency x few days, went to outside UC & was dx w/uti, during visit also endorsed recent intermitt dizziness, found to be bradycardic 40s-60s, referred to ED for further mgmt. EMR reviewed - pt w/h/o symptomatic sinus bradycardia in 2023, admitted to hospital at that time cardio consulted w/u neg, CCB was stopped. Pt not currently on any BB/CCB, was told by US that sx might be related to trazadone use at bedtime. Pt denies any loc, cp/sob/mejias, cough, nv, abd pain, worsening edema.

## 2024-09-10 NOTE — ED PROVIDER NOTE - CARE PROVIDER_API CALL
Mikhail Khan  Internal Medicine  [address] [place] [address]  Phone: [phone]  Fax: [phone]  Established Patient  Follow Up Time: 7-10 Days    Ronel Villa  Cardiovascular Disease  [address] [place] [address], [address]  [place] [address]  Phone: [phone]  Fax: [phone]  Established Patient  Follow Up Time: 7-10 Days

## 2024-09-10 NOTE — ED PROVIDER NOTE - PHYSICAL EXAMINATION
none elderly F >bmi, nad  skin warm, dry  ncat  conjunctival injection bl, otherwise perrl/eomi  neck supple, no bruits  rrr (60s), nl s1s2 no mrg  ctab no wrr  abd soft ntnd no palpable masses no rgr  back non-tender no cvat  ext no cce dpi  neuro aaox3 grossly nf exam

## 2024-09-10 NOTE — ED PROVIDER NOTE - CLINICAL SUMMARY MEDICAL DECISION MAKING FREE TEXT BOX
Labs, EKG and imaging were ordered, where indicated.  Independent interpretation of any labs, EKG & imaging that was ordered was performed by me, Dr. Walls. Appropriate medications for patient's presenting complaints were ordered and effects were reassessed, where indicated.  Patient's records (prior hospital, ED visit, and/or nursing home note) were reviewed, if available.  Additional history was obtained from EMS, family, and/or PCP (where available).  Escalation to admission/observation was considered.  However patient feels much better and patient/parent is comfortable with discharge.  Appropriate follow-up was arranged.     uti, sinus bradycardia - bp nl, neuro exam nf, labs wnl, abx/fluconazole for uti/yeast - all results d/w pt & copies given, strict return precautions discussed, rec outpt PCP/Cardio f/u

## 2024-09-10 NOTE — ED PROVIDER NOTE - NSFOLLOWUPINSTRUCTIONS_ED_ALL_ED_FT
Urinary Tract Infection    A urinary tract infection (UTI) is an infection of any part of the urinary tract, which includes the kidneys, ureters, bladder, and urethra. Risk factors include ignoring your need to urinate, wiping back to front if female, being an uncircumcised male, and having diabetes or a weak immune system. Symptoms include frequent urination, pain or burning with urination, foul smelling urine, cloudy urine, pain in the lower abdomen, blood in the urine, and fever. If you were prescribed an antibiotic medicine, take it as told by your health care provider. Do not stop taking the antibiotic even if you start to feel better.    SEEK IMMEDIATE MEDICAL CARE IF YOU HAVE THE FOLLOWING SYMPTOMS: severe back or abdominal pain, inability to keep fluids or medicine down, dizziness/lightheadedness, or a change in mental status.    Dizziness    Dizziness is a common problem. It is a feeling of unsteadiness or light-headedness. You may feel like you are about to faint. Dizziness can lead to injury if you stumble or fall. Anyone can become dizzy, but dizziness is more common in older adults. This condition can be caused by a number of things, including medicines, dehydration, or illness.    Follow these instructions at home:  Eating and drinking     Drink enough fluid to keep your urine clear or pale yellow. This helps to keep you from becoming dehydrated. Try to drink more clear fluids, such as water.  Do not drink alcohol.  Limit your caffeine intake if told to do so by your health care provider. Check ingredients and nutrition facts to see if a food or beverage contains caffeine.  Limit your salt (sodium) intake if told to do so by your health care provider. Check ingredients and nutrition facts to see if a food or beverage contains sodium.  Activity     Avoid making quick movements.  Rise slowly from chairs and steady yourself until you feel okay.  In the morning, first sit up on the side of the bed. When you feel okay, stand slowly while you hold onto something until you know that your balance is fine.  If you need to  one place for a long time, move your legs often. Tighten and relax the muscles in your legs while you are standing.  Do not drive or use heavy machinery if you feel dizzy.  Avoid bending down if you feel dizzy. Place items in your home so that they are easy for you to reach without leaning over.  Lifestyle     Do not use any products that contain nicotine or tobacco, such as cigarettes and e-cigarettes. If you need help quitting, ask your health care provider.  Try to reduce your stress level by using methods such as yoga or meditation. Talk with your health care provider if you need help to manage your stress.  General instructions     Watch your dizziness for any changes.  Take over-the-counter and prescription medicines only as told by your health care provider. Talk with your health care provider if you think that your dizziness is caused by a medicine that you are taking.  Tell a friend or a family member that you are feeling dizzy. If he or she notices any changes in your behavior, have this person call your health care provider.  Keep all follow-up visits as told by your health care provider. This is important.  Contact a health care provider if:  Your dizziness does not go away.  Your dizziness or light-headedness gets worse.  You feel nauseous.  You have reduced hearing.  You have new symptoms.  You are unsteady on your feet or you feel like the room is spinning.  Get help right away if:  You vomit or have diarrhea and are unable to eat or drink anything.  You have problems talking, walking, swallowing, or using your arms, hands, or legs.  You feel generally weak.  You are not thinking clearly or you have trouble forming sentences. It may take a friend or family member to notice this.  You have chest pain, abdominal pain, shortness of breath, or sweating.  Your vision changes.  You have any bleeding.  You have a severe headache.  You have neck pain or a stiff neck.  You have a fever.  These symptoms may represent a serious problem that is an emergency. Do not wait to see if the symptoms will go away. Get medical help right away. Call your local emergency services (911 in the [place] Do not drive yourself to the hospital.     Summary  Dizziness is a feeling of unsteadiness or light-headedness. This condition can be caused by a number of things, including medicines, dehydration, or illness.  Anyone can become dizzy, but dizziness is more common in older adults.  Drink enough fluid to keep your urine clear or pale yellow. Do not drink alcohol.  Avoid making quick movements if you feel dizzy. Monitor your dizziness for any changes.  This information is not intended to replace advice given to you by your health care provider. Make sure you discuss any questions you have with your health care provider.

## 2024-09-10 NOTE — ED PROVIDER NOTE - CARE PROVIDERS DIRECT ADDRESSES
[email or web address]

## 2024-09-10 NOTE — ED ADULT NURSE NOTE - NSFALLUNIVINTERV_ED_ALL_ED
Bed/Stretcher in lowest position, wheels locked, appropriate side rails in place/Call bell, personal items and telephone in reach/Instruct patient to call for assistance before getting out of bed/chair/stretcher/Non-slip footwear applied when patient is off stretcher/[place] to call system/Physically safe environment - no spills, clutter or unnecessary equipment/Purposeful proactive rounding/Room/bathroom lighting operational, light cord in reach

## 2024-09-13 LAB
-  AMOXICILLIN/CLAVULANIC ACID: SIGNIFICANT CHANGE UP
-  AMPICILLIN/SULBACTAM: SIGNIFICANT CHANGE UP
-  AMPICILLIN: SIGNIFICANT CHANGE UP
-  AZTREONAM: SIGNIFICANT CHANGE UP
-  CEFAZOLIN: SIGNIFICANT CHANGE UP
-  CEFEPIME: SIGNIFICANT CHANGE UP
-  CEFOXITIN: SIGNIFICANT CHANGE UP
-  CEFTRIAXONE: SIGNIFICANT CHANGE UP
-  CIPROFLOXACIN: SIGNIFICANT CHANGE UP
-  ERTAPENEM: SIGNIFICANT CHANGE UP
-  GENTAMICIN: SIGNIFICANT CHANGE UP
-  IMIPENEM: SIGNIFICANT CHANGE UP
-  LEVOFLOXACIN: SIGNIFICANT CHANGE UP
-  MEROPENEM: SIGNIFICANT CHANGE UP
-  NITROFURANTOIN: SIGNIFICANT CHANGE UP
-  PIPERACILLIN/TAZOBACTAM: SIGNIFICANT CHANGE UP
-  TOBRAMYCIN: SIGNIFICANT CHANGE UP
-  TRIMETHOPRIM/SULFAMETHOXAZOLE: SIGNIFICANT CHANGE UP
CULTURE RESULTS: ABNORMAL
METHOD TYPE: SIGNIFICANT CHANGE UP
ORGANISM # SPEC MICROSCOPIC CNT: ABNORMAL
ORGANISM # SPEC MICROSCOPIC CNT: SIGNIFICANT CHANGE UP
SPECIMEN SOURCE: SIGNIFICANT CHANGE UP

## 2024-09-13 RX ORDER — NITROFURANTOIN MONOHYD/M-CRYST 100 MG
1 CAPSULE ORAL
Qty: 14 | Refills: 0
Start: 2024-09-13 | End: 2024-09-19

## 2025-07-08 ENCOUNTER — NON-APPOINTMENT (OUTPATIENT)
Age: 75
End: 2025-07-08

## 2025-07-10 ENCOUNTER — APPOINTMENT (OUTPATIENT)
Dept: CARDIOLOGY | Facility: CLINIC | Age: 75
End: 2025-07-10
Payer: MEDICARE

## 2025-07-10 VITALS
HEIGHT: 63 IN | WEIGHT: 243 LBS | DIASTOLIC BLOOD PRESSURE: 78 MMHG | BODY MASS INDEX: 43.05 KG/M2 | SYSTOLIC BLOOD PRESSURE: 156 MMHG | HEART RATE: 56 BPM

## 2025-07-10 PROBLEM — R00.1 BRADYCARDIA: Status: ACTIVE | Noted: 2025-07-10

## 2025-07-10 PROBLEM — Z78.9 DOES NOT USE TOBACCO: Status: ACTIVE | Noted: 2025-07-10

## 2025-07-10 PROBLEM — Z78.9 DOES NOT USE ILLICIT DRUGS: Status: ACTIVE | Noted: 2025-07-10

## 2025-07-10 PROCEDURE — 93000 ELECTROCARDIOGRAM COMPLETE: CPT

## 2025-07-10 PROCEDURE — 99214 OFFICE O/P EST MOD 30 MIN: CPT | Mod: 25

## 2025-07-10 RX ORDER — HYDROXYZINE HYDROCHLORIDE 25 MG/1
25 TABLET ORAL
Refills: 0 | Status: ACTIVE | COMMUNITY

## 2025-07-10 RX ORDER — AMLODIPINE AND OLMESARTAN MEDOXOMIL 5; 20 MG/1; MG/1
5-20 TABLET ORAL
Qty: 90 | Refills: 3 | Status: ACTIVE | COMMUNITY
Start: 2025-07-10 | End: 1900-01-01

## 2025-07-10 RX ORDER — ATORVASTATIN CALCIUM 40 MG/1
40 TABLET, FILM COATED ORAL
Qty: 90 | Refills: 2 | Status: ACTIVE | COMMUNITY

## 2025-07-10 RX ORDER — CEFUROXIME AXETIL 500 MG/1
500 TABLET, FILM COATED ORAL
Qty: 14 | Refills: 0 | Status: ACTIVE | COMMUNITY

## 2025-07-10 RX ORDER — FUROSEMIDE 40 MG/1
40 TABLET ORAL
Qty: 180 | Refills: 3 | Status: ACTIVE | COMMUNITY

## 2025-07-10 RX ORDER — TRAZODONE HYDROCHLORIDE 100 MG/1
100 TABLET ORAL
Refills: 0 | Status: ACTIVE | COMMUNITY

## 2025-07-16 ENCOUNTER — APPOINTMENT (OUTPATIENT)
Dept: CARDIOLOGY | Facility: CLINIC | Age: 75
End: 2025-07-16
Payer: MEDICARE

## 2025-07-16 PROCEDURE — 93306 TTE W/DOPPLER COMPLETE: CPT

## 2025-07-18 ENCOUNTER — APPOINTMENT (OUTPATIENT)
Dept: CARDIOLOGY | Facility: CLINIC | Age: 75
End: 2025-07-18

## 2025-07-18 PROBLEM — R06.02 SHORTNESS OF BREATH ON EXERTION: Status: ACTIVE | Noted: 2025-07-10

## 2025-07-18 PROBLEM — I10 BENIGN ESSENTIAL HYPERTENSION: Status: ACTIVE | Noted: 2025-07-18

## 2025-07-18 RX ORDER — HYDROCHLOROTHIAZIDE 12.5 MG/1
12.5 TABLET ORAL
Qty: 90 | Refills: 3 | Status: ACTIVE | COMMUNITY
Start: 2025-07-18 | End: 1900-01-01

## 2025-09-09 ENCOUNTER — RESULT REVIEW (OUTPATIENT)
Age: 75
End: 2025-09-09

## 2025-09-10 ENCOUNTER — APPOINTMENT (OUTPATIENT)
Dept: CARDIOLOGY | Facility: CLINIC | Age: 75
End: 2025-09-10
Payer: MEDICARE

## 2025-09-10 VITALS
BODY MASS INDEX: 42.88 KG/M2 | WEIGHT: 242 LBS | SYSTOLIC BLOOD PRESSURE: 112 MMHG | HEIGHT: 63 IN | HEART RATE: 60 BPM | DIASTOLIC BLOOD PRESSURE: 68 MMHG

## 2025-09-10 DIAGNOSIS — I10 ESSENTIAL (PRIMARY) HYPERTENSION: ICD-10-CM

## 2025-09-10 DIAGNOSIS — Z00.00 ENCOUNTER FOR GENERAL ADULT MEDICAL EXAMINATION W/OUT ABNORMAL FINDINGS: ICD-10-CM

## 2025-09-10 PROCEDURE — 93000 ELECTROCARDIOGRAM COMPLETE: CPT

## 2025-09-10 PROCEDURE — 99214 OFFICE O/P EST MOD 30 MIN: CPT | Mod: 25
